# Patient Record
Sex: FEMALE | Race: OTHER | HISPANIC OR LATINO | Employment: UNEMPLOYED | URBAN - METROPOLITAN AREA
[De-identification: names, ages, dates, MRNs, and addresses within clinical notes are randomized per-mention and may not be internally consistent; named-entity substitution may affect disease eponyms.]

---

## 2019-03-09 ENCOUNTER — HOSPITAL ENCOUNTER (OUTPATIENT)
Facility: HOSPITAL | Age: 44
Setting detail: OBSERVATION
Discharge: HOME | End: 2019-03-12
Attending: EMERGENCY MEDICINE | Admitting: INTERNAL MEDICINE
Payer: MEDICAID

## 2019-03-09 DIAGNOSIS — R00.2 PALPITATIONS: ICD-10-CM

## 2019-03-09 DIAGNOSIS — E11.9 DIABETES (HCC): ICD-10-CM

## 2019-03-09 DIAGNOSIS — R51.9 HEADACHE: Primary | ICD-10-CM

## 2019-03-09 DIAGNOSIS — I66.9 STENOSIS OF INTRACRANIAL VESSEL: ICD-10-CM

## 2019-03-09 PROCEDURE — 99285 EMERGENCY DEPT VISIT HI MDM: CPT

## 2019-03-10 ENCOUNTER — APPOINTMENT (OUTPATIENT)
Dept: MRI IMAGING | Facility: HOSPITAL | Age: 44
End: 2019-03-10
Payer: MEDICAID

## 2019-03-10 ENCOUNTER — APPOINTMENT (EMERGENCY)
Dept: CT IMAGING | Facility: HOSPITAL | Age: 44
End: 2019-03-10
Payer: MEDICAID

## 2019-03-10 PROBLEM — I65.23 BILATERAL CAROTID ARTERY STENOSIS: Status: ACTIVE | Noted: 2019-03-10

## 2019-03-10 PROBLEM — G44.51 HEMICRANIA CONTINUA: Status: ACTIVE | Noted: 2019-03-10

## 2019-03-10 LAB
ALBUMIN SERPL BCP-MCNC: 4.2 G/DL (ref 3.5–5)
ALP SERPL-CCNC: 55 U/L (ref 46–116)
ALT SERPL W P-5'-P-CCNC: 30 U/L (ref 12–78)
ANION GAP SERPL CALCULATED.3IONS-SCNC: 10 MMOL/L (ref 4–13)
AST SERPL W P-5'-P-CCNC: 17 U/L (ref 5–45)
ATRIAL RATE: 96 BPM
BASOPHILS # BLD AUTO: 0.09 THOUSANDS/ΜL (ref 0–0.1)
BASOPHILS NFR BLD AUTO: 1 % (ref 0–1)
BILIRUB SERPL-MCNC: 0.3 MG/DL (ref 0.2–1)
BILIRUB UR QL STRIP: NEGATIVE
BUN SERPL-MCNC: 16 MG/DL (ref 5–25)
CALCIUM SERPL-MCNC: 9.1 MG/DL (ref 8.3–10.1)
CHLORIDE SERPL-SCNC: 100 MMOL/L (ref 100–108)
CHOLEST SERPL-MCNC: 190 MG/DL (ref 50–200)
CLARITY UR: CLEAR
CO2 SERPL-SCNC: 26 MMOL/L (ref 21–32)
COLOR UR: YELLOW
CREAT SERPL-MCNC: 0.79 MG/DL (ref 0.6–1.3)
CRP SERPL QL: 7.9 MG/L
DEPRECATED D DIMER PPP: 270 NG/ML (FEU)
EOSINOPHIL # BLD AUTO: 0.37 THOUSAND/ΜL (ref 0–0.61)
EOSINOPHIL NFR BLD AUTO: 5 % (ref 0–6)
ERYTHROCYTE [DISTWIDTH] IN BLOOD BY AUTOMATED COUNT: 17 % (ref 11.6–15.1)
ERYTHROCYTE [SEDIMENTATION RATE] IN BLOOD: 7 MM/HOUR (ref 0–20)
EST. AVERAGE GLUCOSE BLD GHB EST-MCNC: 146 MG/DL
EXT PREG TEST URINE: NEGATIVE
FERRITIN SERPL-MCNC: 8 NG/ML (ref 8–388)
GFR SERPL CREATININE-BSD FRML MDRD: 91 ML/MIN/1.73SQ M
GLUCOSE SERPL-MCNC: 172 MG/DL (ref 65–140)
GLUCOSE UR STRIP-MCNC: NEGATIVE MG/DL
HBA1C MFR BLD: 6.7 % (ref 4.2–6.3)
HCT VFR BLD AUTO: 37.8 % (ref 34.8–46.1)
HDLC SERPL-MCNC: 43 MG/DL (ref 40–60)
HGB BLD-MCNC: 11.6 G/DL (ref 11.5–15.4)
HGB UR QL STRIP.AUTO: NEGATIVE
IMM GRANULOCYTES # BLD AUTO: 0.04 THOUSAND/UL (ref 0–0.2)
IMM GRANULOCYTES NFR BLD AUTO: 1 % (ref 0–2)
IRON SATN MFR SERPL: 34 %
IRON SERPL-MCNC: 163 UG/DL (ref 50–170)
KETONES UR STRIP-MCNC: NEGATIVE MG/DL
LDLC SERPL CALC-MCNC: 122 MG/DL (ref 0–100)
LEUKOCYTE ESTERASE UR QL STRIP: NEGATIVE
LYMPHOCYTES # BLD AUTO: 1.85 THOUSANDS/ΜL (ref 0.6–4.47)
LYMPHOCYTES NFR BLD AUTO: 23 % (ref 14–44)
MAGNESIUM SERPL-MCNC: 1.9 MG/DL (ref 1.6–2.6)
MCH RBC QN AUTO: 24.9 PG (ref 26.8–34.3)
MCHC RBC AUTO-ENTMCNC: 30.7 G/DL (ref 31.4–37.4)
MCV RBC AUTO: 81 FL (ref 82–98)
MONOCYTES # BLD AUTO: 0.42 THOUSAND/ΜL (ref 0.17–1.22)
MONOCYTES NFR BLD AUTO: 5 % (ref 4–12)
NEUTROPHILS # BLD AUTO: 5.17 THOUSANDS/ΜL (ref 1.85–7.62)
NEUTS SEG NFR BLD AUTO: 65 % (ref 43–75)
NITRITE UR QL STRIP: NEGATIVE
NRBC BLD AUTO-RTO: 0 /100 WBCS
P AXIS: 56 DEGREES
PH UR STRIP.AUTO: 5.5 [PH]
PLATELET # BLD AUTO: 353 THOUSANDS/UL (ref 149–390)
PLATELET # BLD AUTO: 403 THOUSANDS/UL (ref 149–390)
PMV BLD AUTO: 9.8 FL (ref 8.9–12.7)
PMV BLD AUTO: 9.9 FL (ref 8.9–12.7)
POTASSIUM SERPL-SCNC: 3.7 MMOL/L (ref 3.5–5.3)
PR INTERVAL: 164 MS
PROT SERPL-MCNC: 8 G/DL (ref 6.4–8.2)
PROT UR STRIP-MCNC: NEGATIVE MG/DL
QRS AXIS: 55 DEGREES
QRSD INTERVAL: 86 MS
QT INTERVAL: 360 MS
QTC INTERVAL: 454 MS
RBC # BLD AUTO: 4.65 MILLION/UL (ref 3.81–5.12)
SODIUM SERPL-SCNC: 136 MMOL/L (ref 136–145)
SP GR UR STRIP.AUTO: 1.01 (ref 1–1.03)
T WAVE AXIS: 22 DEGREES
TIBC SERPL-MCNC: 479 UG/DL (ref 250–450)
TRIGL SERPL-MCNC: 125 MG/DL
TROPONIN I SERPL-MCNC: <0.02 NG/ML
TSH SERPL DL<=0.05 MIU/L-ACNC: 1.69 UIU/ML (ref 0.36–3.74)
UROBILINOGEN UR QL STRIP.AUTO: 0.2 E.U./DL
VENTRICULAR RATE: 96 BPM
WBC # BLD AUTO: 7.94 THOUSAND/UL (ref 4.31–10.16)

## 2019-03-10 PROCEDURE — 82652 VIT D 1 25-DIHYDROXY: CPT | Performed by: PSYCHIATRY & NEUROLOGY

## 2019-03-10 PROCEDURE — 85379 FIBRIN DEGRADATION QUANT: CPT | Performed by: PSYCHIATRY & NEUROLOGY

## 2019-03-10 PROCEDURE — 85306 CLOT INHIBIT PROT S FREE: CPT | Performed by: PSYCHIATRY & NEUROLOGY

## 2019-03-10 PROCEDURE — 85025 COMPLETE CBC W/AUTO DIFF WBC: CPT | Performed by: EMERGENCY MEDICINE

## 2019-03-10 PROCEDURE — 82607 VITAMIN B-12: CPT | Performed by: PSYCHIATRY & NEUROLOGY

## 2019-03-10 PROCEDURE — 83036 HEMOGLOBIN GLYCOSYLATED A1C: CPT | Performed by: INTERNAL MEDICINE

## 2019-03-10 PROCEDURE — 99220 PR INITIAL OBSERVATION CARE/DAY 70 MINUTES: CPT | Performed by: INTERNAL MEDICINE

## 2019-03-10 PROCEDURE — 85670 THROMBIN TIME PLASMA: CPT | Performed by: PSYCHIATRY & NEUROLOGY

## 2019-03-10 PROCEDURE — 81240 F2 GENE: CPT | Performed by: PSYCHIATRY & NEUROLOGY

## 2019-03-10 PROCEDURE — 85652 RBC SED RATE AUTOMATED: CPT | Performed by: EMERGENCY MEDICINE

## 2019-03-10 PROCEDURE — 85303 CLOT INHIBIT PROT C ACTIVITY: CPT | Performed by: PSYCHIATRY & NEUROLOGY

## 2019-03-10 PROCEDURE — 93010 ELECTROCARDIOGRAM REPORT: CPT | Performed by: INTERNAL MEDICINE

## 2019-03-10 PROCEDURE — 85732 THROMBOPLASTIN TIME PARTIAL: CPT | Performed by: PSYCHIATRY & NEUROLOGY

## 2019-03-10 PROCEDURE — 86140 C-REACTIVE PROTEIN: CPT | Performed by: INTERNAL MEDICINE

## 2019-03-10 PROCEDURE — 83550 IRON BINDING TEST: CPT | Performed by: PSYCHIATRY & NEUROLOGY

## 2019-03-10 PROCEDURE — 85305 CLOT INHIBIT PROT S TOTAL: CPT | Performed by: PSYCHIATRY & NEUROLOGY

## 2019-03-10 PROCEDURE — 86235 NUCLEAR ANTIGEN ANTIBODY: CPT | Performed by: PSYCHIATRY & NEUROLOGY

## 2019-03-10 PROCEDURE — 96374 THER/PROPH/DIAG INJ IV PUSH: CPT

## 2019-03-10 PROCEDURE — 83520 IMMUNOASSAY QUANT NOS NONAB: CPT | Performed by: PSYCHIATRY & NEUROLOGY

## 2019-03-10 PROCEDURE — 96375 TX/PRO/DX INJ NEW DRUG ADDON: CPT

## 2019-03-10 PROCEDURE — 81003 URINALYSIS AUTO W/O SCOPE: CPT | Performed by: EMERGENCY MEDICINE

## 2019-03-10 PROCEDURE — 85300 ANTITHROMBIN III ACTIVITY: CPT | Performed by: PSYCHIATRY & NEUROLOGY

## 2019-03-10 PROCEDURE — 70551 MRI BRAIN STEM W/O DYE: CPT

## 2019-03-10 PROCEDURE — 96361 HYDRATE IV INFUSION ADD-ON: CPT

## 2019-03-10 PROCEDURE — 85705 THROMBOPLASTIN INHIBITION: CPT | Performed by: PSYCHIATRY & NEUROLOGY

## 2019-03-10 PROCEDURE — 86147 CARDIOLIPIN ANTIBODY EA IG: CPT | Performed by: PSYCHIATRY & NEUROLOGY

## 2019-03-10 PROCEDURE — 83735 ASSAY OF MAGNESIUM: CPT | Performed by: EMERGENCY MEDICINE

## 2019-03-10 PROCEDURE — 36415 COLL VENOUS BLD VENIPUNCTURE: CPT | Performed by: EMERGENCY MEDICINE

## 2019-03-10 PROCEDURE — 86162 COMPLEMENT TOTAL (CH50): CPT | Performed by: PSYCHIATRY & NEUROLOGY

## 2019-03-10 PROCEDURE — 85049 AUTOMATED PLATELET COUNT: CPT | Performed by: INTERNAL MEDICINE

## 2019-03-10 PROCEDURE — 85613 RUSSELL VIPER VENOM DILUTED: CPT | Performed by: PSYCHIATRY & NEUROLOGY

## 2019-03-10 PROCEDURE — 84484 ASSAY OF TROPONIN QUANT: CPT | Performed by: EMERGENCY MEDICINE

## 2019-03-10 PROCEDURE — 80053 COMPREHEN METABOLIC PANEL: CPT | Performed by: EMERGENCY MEDICINE

## 2019-03-10 PROCEDURE — 83540 ASSAY OF IRON: CPT | Performed by: PSYCHIATRY & NEUROLOGY

## 2019-03-10 PROCEDURE — 99204 OFFICE O/P NEW MOD 45 MIN: CPT | Performed by: PSYCHIATRY & NEUROLOGY

## 2019-03-10 PROCEDURE — 93005 ELECTROCARDIOGRAM TRACING: CPT

## 2019-03-10 PROCEDURE — 70496 CT ANGIOGRAPHY HEAD: CPT

## 2019-03-10 PROCEDURE — 81025 URINE PREGNANCY TEST: CPT

## 2019-03-10 PROCEDURE — 86146 BETA-2 GLYCOPROTEIN ANTIBODY: CPT | Performed by: PSYCHIATRY & NEUROLOGY

## 2019-03-10 PROCEDURE — 84443 ASSAY THYROID STIM HORMONE: CPT | Performed by: EMERGENCY MEDICINE

## 2019-03-10 PROCEDURE — 81241 F5 GENE: CPT | Performed by: PSYCHIATRY & NEUROLOGY

## 2019-03-10 PROCEDURE — 82728 ASSAY OF FERRITIN: CPT | Performed by: PSYCHIATRY & NEUROLOGY

## 2019-03-10 PROCEDURE — 80061 LIPID PANEL: CPT | Performed by: INTERNAL MEDICINE

## 2019-03-10 PROCEDURE — 70544 MR ANGIOGRAPHY HEAD W/O DYE: CPT

## 2019-03-10 PROCEDURE — 99244 OFF/OP CNSLTJ NEW/EST MOD 40: CPT | Performed by: INTERNAL MEDICINE

## 2019-03-10 RX ORDER — ASPIRIN 81 MG/1
324 TABLET, CHEWABLE ORAL ONCE
Status: COMPLETED | OUTPATIENT
Start: 2019-03-10 | End: 2019-03-10

## 2019-03-10 RX ORDER — DIPHENHYDRAMINE HYDROCHLORIDE 50 MG/ML
25 INJECTION INTRAMUSCULAR; INTRAVENOUS ONCE
Status: COMPLETED | OUTPATIENT
Start: 2019-03-10 | End: 2019-03-10

## 2019-03-10 RX ORDER — KETOROLAC TROMETHAMINE 30 MG/ML
30 INJECTION, SOLUTION INTRAMUSCULAR; INTRAVENOUS EVERY 12 HOURS SCHEDULED
Status: DISCONTINUED | OUTPATIENT
Start: 2019-03-10 | End: 2019-03-12

## 2019-03-10 RX ORDER — LORAZEPAM 2 MG/ML
2 INJECTION INTRAMUSCULAR ONCE
Status: COMPLETED | OUTPATIENT
Start: 2019-03-10 | End: 2019-03-10

## 2019-03-10 RX ORDER — TOPIRAMATE 25 MG/1
25 TABLET ORAL 2 TIMES DAILY
Status: DISCONTINUED | OUTPATIENT
Start: 2019-03-10 | End: 2019-03-12 | Stop reason: HOSPADM

## 2019-03-10 RX ORDER — DIPHENHYDRAMINE HYDROCHLORIDE 50 MG/ML
25 INJECTION INTRAMUSCULAR; INTRAVENOUS EVERY 8 HOURS PRN
Status: DISCONTINUED | OUTPATIENT
Start: 2019-03-10 | End: 2019-03-12 | Stop reason: HOSPADM

## 2019-03-10 RX ORDER — ATORVASTATIN CALCIUM 40 MG/1
80 TABLET, FILM COATED ORAL
Status: DISCONTINUED | OUTPATIENT
Start: 2019-03-10 | End: 2019-03-10

## 2019-03-10 RX ORDER — METOCLOPRAMIDE HYDROCHLORIDE 5 MG/ML
10 INJECTION INTRAMUSCULAR; INTRAVENOUS EVERY 8 HOURS SCHEDULED
Status: DISCONTINUED | OUTPATIENT
Start: 2019-03-10 | End: 2019-03-12

## 2019-03-10 RX ORDER — KETOROLAC TROMETHAMINE 30 MG/ML
15 INJECTION, SOLUTION INTRAMUSCULAR; INTRAVENOUS ONCE
Status: COMPLETED | OUTPATIENT
Start: 2019-03-10 | End: 2019-03-10

## 2019-03-10 RX ORDER — ASPIRIN 81 MG/1
81 TABLET, CHEWABLE ORAL DAILY
Status: DISCONTINUED | OUTPATIENT
Start: 2019-03-10 | End: 2019-03-12

## 2019-03-10 RX ORDER — METOCLOPRAMIDE HYDROCHLORIDE 5 MG/ML
10 INJECTION INTRAMUSCULAR; INTRAVENOUS ONCE
Status: COMPLETED | OUTPATIENT
Start: 2019-03-10 | End: 2019-03-10

## 2019-03-10 RX ORDER — ATORVASTATIN CALCIUM 40 MG/1
40 TABLET, FILM COATED ORAL
Status: DISCONTINUED | OUTPATIENT
Start: 2019-03-11 | End: 2019-03-12

## 2019-03-10 RX ADMIN — ENOXAPARIN SODIUM 40 MG: 40 INJECTION SUBCUTANEOUS at 15:24

## 2019-03-10 RX ADMIN — METOCLOPRAMIDE 10 MG: 5 INJECTION, SOLUTION INTRAMUSCULAR; INTRAVENOUS at 03:49

## 2019-03-10 RX ADMIN — DIPHENHYDRAMINE HYDROCHLORIDE 25 MG: 50 INJECTION, SOLUTION INTRAMUSCULAR; INTRAVENOUS at 03:44

## 2019-03-10 RX ADMIN — METOCLOPRAMIDE 10 MG: 5 INJECTION, SOLUTION INTRAMUSCULAR; INTRAVENOUS at 22:29

## 2019-03-10 RX ADMIN — LORAZEPAM 2 MG: 2 INJECTION INTRAMUSCULAR; INTRAVENOUS at 12:57

## 2019-03-10 RX ADMIN — KETOROLAC TROMETHAMINE 15 MG: 30 INJECTION, SOLUTION INTRAMUSCULAR at 03:45

## 2019-03-10 RX ADMIN — TOPIRAMATE 25 MG: 25 TABLET, FILM COATED ORAL at 18:12

## 2019-03-10 RX ADMIN — ATORVASTATIN CALCIUM 80 MG: 40 TABLET, FILM COATED ORAL at 18:04

## 2019-03-10 RX ADMIN — ASPIRIN 81 MG 324 MG: 81 TABLET ORAL at 06:20

## 2019-03-10 RX ADMIN — SODIUM CHLORIDE 1000 ML: 0.9 INJECTION, SOLUTION INTRAVENOUS at 03:43

## 2019-03-10 RX ADMIN — IOHEXOL 85 ML: 350 INJECTION, SOLUTION INTRAVENOUS at 01:11

## 2019-03-10 RX ADMIN — KETOROLAC TROMETHAMINE 30 MG: 30 INJECTION, SOLUTION INTRAMUSCULAR at 22:28

## 2019-03-10 NOTE — SPEECH THERAPY NOTE
Speech Language/Pathology  Consult received  Records reviewed  Pt admitted c headache  MRI negative and pt passed RN Dysphagia Assessment  SLP spoke to RN, she reported no speech or swallowing deficits at this time  Neurology reported in their note, no speech or swallowing deficits at this time  No additional inpatient Speech Pathology evaluation appears indicated at this time  Please re-consult if additional concerns arise

## 2019-03-10 NOTE — ED NOTES
MRI called and will be here for pt around [id number]  Pt made aware that and informed that medication will be given around 1255        [facility], RN  03/10/19 3435

## 2019-03-10 NOTE — H&P
H&P- Cleo Brothers 1975, 39 y o  female MRN: [id number]    Unit/Bed#: ED 19 Encounter: [id number]    Primary Care Provider: Linsey Real MD   Date and time admitted to hospital: 3/9/2019 11:20 PM        * Bilateral carotid artery stenosis  Assessment & Plan  -consult neurology  -ASA, statin  -lipid profile  -MRI brain    Hemicrania continua  Assessment & Plan  -PRN medication  -send ESR/CRP to r/o Temporal artery arteritis    VTE Prophylaxis: Enoxaparin (Lovenox)  / sequential compression device   Code Status: full code   POLST: POLST form is not discussed and not completed at this time  Discussion with family: patient    Anticipated Length of Stay:  Patient will be admitted on an Observation basis with an anticipated length of stay of  Less than 2 midnights  Justification for Hospital Stay: evaluation of HA, b/l carotid artery stenosis    Total Time for Visit, including Counseling / Coordination of Care: 45 minutes  Greater than 50% of this total time spent on direct patient counseling and coordination of care  Chief Complaint:   headache     History of Present Illness:    Cleo Brothers is a 39 y o  female who presents with headache, for last 3 weeks, different from her usual migraine headache, on right temple, dull, continuous, up/down, associated sometime with severe lightheadedness, almost passing out  Fatigue, lack of energy, tender with pressing hard on lateral right temple, sometime has blurry vision  ER course: CTA head and neck show severe carotid artery stenosis, TELE NEURO was consulted, recommend treating as TIA, consult neurology, further wkup with MRI  Review of Systems:    Review of Systems  A comprehensive 10 point review system conducted all negative except as mentioned in HPI     Past Medical and Surgical History:     Past Medical History:   Diagnosis Date    Asthma     Crohn disease ([facility] [address] )        Past Surgical History:   Procedure Laterality Date     SECTION  HERNIA REPAIR         Meds/Allergies:    Prior to Admission medications    Medication Sig Start Date End Date Taking? Authorizing Provider   ALBUTEROL IN Inhale    Historical Provider, MD   predniSONE 20 mg tablet Take 3 tablets by mouth daily  Patient not taking: Reported on 3/10/2019 10/17/16   Clementina Vargasr,      I have reviewed home medications with patient personally  Allergies: Allergies   Allergen Reactions    Morphine Hives       Social History:     Marital Status: Significant Other   Patient Pre-hospital Living Situation:  Home  Patient Pre-hospital Level of Mobility:  Ambulatory  Patient Pre-hospital Diet Restrictions:  None  Substance Use History:   Social History     Substance and Sexual Activity   Alcohol Use No     Social History     Tobacco Use   Smoking Status Former Smoker   Smokeless Tobacco Never Used     Social History     Substance and Sexual Activity   Drug Use No       Family History:    non-contributory    Physical Exam:     Vitals:   Blood Pressure: 144/82 (03/10/19 0645)  Pulse: 95 (03/10/19 0645)  Temperature: 98 6 °F (37 °C) (03/10/19 0645)  Temp Source: Oral (03/10/19 0645)  Respirations: 18 (03/10/19 0645)  Height: 5' 6" (167 6 cm) (03/09/19 2318)  Weight - Scale: 78 kg (172 lb) (03/10/19 0030)  SpO2: 98 % (03/10/19 0645)    Physical Exam   Constitutional: She is oriented to person, place, and time  She appears well-developed and well-nourished  HENT:   Head: Normocephalic and atraumatic  Eyes: Pupils are equal, round, and reactive to light  EOM are normal    Cardiovascular: Normal rate and regular rhythm  Pulmonary/Chest: Effort normal and breath sounds normal    Abdominal: Soft  There is no tenderness  There is no rebound and no guarding  Musculoskeletal: Normal range of motion  She exhibits no tenderness or deformity  Neurological: She is alert and oriented to person, place, and time  Skin: Skin is warm and dry     Psychiatric: She has a normal mood and affect  Additional Data:     Lab Results: I have personally reviewed pertinent reports  Results from last 7 days   Lab Units 03/10/19  0025   WBC Thousand/uL 7 94   HEMOGLOBIN g/dL 11 6   HEMATOCRIT % 37 8   PLATELETS Thousands/uL 403*   NEUTROS PCT % 65   LYMPHS PCT % 23   MONOS PCT % 5   EOS PCT % 5     Results from last 7 days   Lab Units 03/10/19  0025   SODIUM mmol/L 136   POTASSIUM mmol/L 3 7   CHLORIDE mmol/L 100   CO2 mmol/L 26   BUN mg/dL 16   CREATININE mg/dL 0 79   ANION GAP mmol/L 10   CALCIUM mg/dL 9 1   ALBUMIN g/dL 4 2   TOTAL BILIRUBIN mg/dL 0 30   ALK PHOS U/L 55   ALT U/L 30   AST U/L 17   GLUCOSE RANDOM mg/dL 172*                       Imaging: I have personally reviewed pertinent reports  CTA head with and without contrast   ED Interpretation by Tosin Dotson DO (03/10 0304)   High grade b/l ICA stenosis at the level of the cavernous sinus          EKG, Pathology, and Other Studies Reviewed on Admission:   · EKG: no ST-T cahnges    Allscripts / Epic Records Reviewed: Yes     ** Please Note: This note has been constructed using a voice recognition system   **

## 2019-03-10 NOTE — ASSESSMENT & PLAN NOTE
· consult neurology  · Recommending MRV of cerebral venous sinuses, d-Dimer, benefit from Acetazolamide, will wait until after the MRV  · Past complaining of Headache, likely venous sinus thrombosis  · Will evaluate for hypercoagulable state as well  · Continue ASA, statin  · Lipid profile completed LDL elevated otherwise unremarkable  · -MRI brain

## 2019-03-10 NOTE — CONSULTS
Consultation - Cardiology Team One  Armen Simmons 39 y o  female MRN: [id number]  Unit/Bed#: ED 25 Encounter: [id number]    Inpatient consult to Cardiology  Consult performed by: MICHELLE Betts  Consult ordered by: Robin Castillo MD          Physician Requesting Consult: Emma Peres MD  Reason for Consult / Principal Problem:  Palpitations    HPI: Cardiologist   Kitty Sicard is a 39y o  year old female who has a history of asthma and Crohn's disease presents to the emergency room with a headache x3 weeks  Patient states she has had a right temporal headache associated with fatigue and blurry vision for the last 3 weeks  She is tender to palpation on the right temple  Along with a headache she has also been complaining of palpitations which are associated with lightheadedness, dizziness and a feeling of flip-flopping going on in her chest   Patient has no significant history of coronary artery disease or any risk factors at the present time  EKG on admission showed normal sinus rhythm, CT of the head and MRI of the brain were both negative  On telemetry she is sinus tachycardia with a rate anywhere from the 90s to low 100s  Patient currently takes prednisone 20 mg p o  Daily  TSH was normal as well as D-dimer  Lipid panel was drawn LDL was noted to be elevated at 122       REVIEW OF SYSTEMS:  Constitutional:  Denies fever or chills   Eyes:  Positive for in visual acuity   HENT:  Denies nasal congestion or sore throat, positive for right sided headache   Respiratory:  Denies cough or shortness of breath   Cardiovascular:  Denies chest pain or edema , positive for palpitations  GI:  Denies abdominal pain, nausea, vomiting, bloody stools or diarrhea   :  Denies dysuria, frequency, difficulty in micturition and nocturia  Musculoskeletal:  Denies back pain or joint pain   Neurologic:  Denies headache, focal weakness or sensory changes   Endocrine:  Denies polyuria or polydipsia Lymphatic:  Denies swollen glands   Psychiatric:  Denies depression or anxiety     Historical Information   Past Medical History:   Diagnosis Date    Asthma     Crohn disease ([address] )      Past Surgical History:   Procedure Laterality Date     SECTION      HERNIA REPAIR       Social History     Substance and Sexual Activity   Alcohol Use No     Social History     Substance and Sexual Activity   Drug Use No     Social History     Tobacco Use   Smoking Status Former Smoker   Smokeless Tobacco Never Used       Family History: History reviewed  No pertinent family history  MEDS & ALLERGIES:  all current active meds have been reviewed and current meds:   Current Facility-Administered Medications   Medication Dose Route Frequency    aspirin chewable tablet 81 mg  81 mg Oral Daily    atorvastatin (LIPITOR) tablet 80 mg  80 mg Oral Daily With Dinner    enoxaparin (LOVENOX) subcutaneous injection 40 mg  40 mg Subcutaneous Daily        Allergies   Allergen Reactions    Morphine Hives       OBJECTIVE:  Vitals:   Vitals:    03/10/19 1400   BP: 124/70   Pulse: 90   Resp: 16   Temp:    SpO2: 99%     Body mass index is 27 76 kg/m²  Systolic (05SVR), ROJ:904 , Min:122 , WTW:489     Diastolic (45KDD), JLB:10, Min:70, Max:96      Intake/Output Summary (Last 24 hours) at 3/10/2019 1556  Last data filed at 3/10/2019 0430  Gross per 24 hour   Intake 2000 ml   Output    Net 2000 ml     Weight (last 2 days)     Date/Time   Weight    03/10/19 0030   78 (172)    19 2318   78 (172)            Invasive Devices     Peripheral Intravenous Line            Peripheral IV 03/10/19 Left Arm less than 1 day                PHYSICAL EXAMS:  General:  Patient is not in acute distress, laying in the bed comfortably, awake, alert responding to commands  Head: Normocephalic, Atraumatic     HEENT: White sclera, pink conjunctiva,  PERRLA,pharynx benign  Neck:  Supple, no neck vein distention, carotids+2/+2 no bruits, thyromegaly, adenopathy  Respiratory: clear to P/A  Cardiovascular:  PMI normal, S1-S2 normal, No  Murmurs, thrills, gallops, rubs   Regular rhythm  GI:  Abdomen soft nontender   No hepatosplenomegaly, adenopathy, ascites,or rebound tenderness  Extremities: No edema, normal pulses, no calf tenderness, no joint deformities, no venous disease   Integument:  No skin rashes or ulceration  Lymphatic:  No cervical or inguinal lymphadenopathy  Neurologic:  Patient is awake alert, responding to command, well-oriented to time and place and person moving all extremities    LABORATORY RESULTS:  Results from last 7 days   Lab Units 03/10/19  0025   TROPONIN I ng/mL <0 02     CBC with diff:   Results from last 7 days   Lab Units 03/10/19  0813 03/10/19  0025   WBC Thousand/uL  --  7 94   HEMOGLOBIN g/dL  --  11 6   HEMATOCRIT %  --  37 8   MCV fL  --  81*   PLATELETS Thousands/uL 353 403*   MCH pg  --  24 9*   MCHC g/dL  --  30 7*   RDW %  --  17 0*   MPV fL 9 9 9 8   NRBC AUTO /100 WBCs  --  0       CMP:  Results from last 7 days   Lab Units 03/10/19  0025   POTASSIUM mmol/L 3 7   CHLORIDE mmol/L 100   CO2 mmol/L 26   BUN mg/dL 16   CREATININE mg/dL 0 79   CALCIUM mg/dL 9 1   AST U/L 17   ALT U/L 30   ALK PHOS U/L 55   EGFR ml/min/1 73sq m 91       BMP:  Results from last 7 days   Lab Units 03/10/19  0025   POTASSIUM mmol/L 3 7   CHLORIDE mmol/L 100   CO2 mmol/L 26   BUN mg/dL 16   CREATININE mg/dL 0 79   CALCIUM mg/dL 9 1            Results from last 7 days   Lab Units 03/10/19  0025   MAGNESIUM mg/dL 1 9     Results from last 7 days   Lab Units 03/10/19  0813   HEMOGLOBIN A1C % 6 7*     Results from last 7 days   Lab Units 03/10/19  0025   TSH 3RD GENERATON uIU/mL 1 692           Lipid Profile:   No results found for: CHOL  Lab Results   Component Value Date    HDL 43 03/10/2019     Lab Results   Component Value Date    LDLCALC 122 (H) 03/10/2019     Lab Results   Component Value Date    TRIG 125 03/10/2019       Cardiac testing: Echocardiogram ordered and pending      Imaging:   I have personally reviewed pertinent reports  EKG reviewed personally:  Normal sinus rhythm    Telemetry reviewed personally:  Sinus tachycardia with a rate anywhere from 90s to 100s    Assessment/Plan:    1  Palpitations associated with lightheadedness and dizziness  -echocardiogram ordered and pending  -continue to monitor on telemetry  -TSH normal    2  Hyperlipidemia,   - continue atorvastatin 80 mg p o  Daily  -if patient without acute CVA consider decreasing to 40 mg p o  Daily       Code Status: Level 1 - Full Code    Counseling / Coordination of Care  Total floor / unit time spent today 25 minutes  Greater than 50% of total time was spent with the patient and / or family counseling and / or coordination of care  A description of the counseling / coordination of care: Review of history, current assessment, development of a plan      [address] [place] [address]  3/10/2019,3:56 PM

## 2019-03-10 NOTE — QUICK NOTE
Neurology    Paged around 546 AM from ED regarding patient  Patient with one month of intermittent lightheadedness- unclear if positional and 3 5 weeks of headaches different than her typical migraine  Neuro exam non focal  CTA H/N read by radiology as high grade stenosis bilateral carotid siphons  Reviewed in PACS personally with distal flow to the [facility] branches noted and no evidence of occlusion  No prior hx stroke or TIA reported  SBP 140s  A/P   Given her presentation concern for worsening gradual ICA stenosis vs atypical migraine with her hx  She does have vascular RF-- HLD and former smoker   and Lipitor 80 now and daily  No emergent intervention as NIHSS 0/ no focal deficits and patient with symptom onset for almost a month  Called radiology to discuss CTA however their PACS system is down at this time but should be back soon per them  Recommend admit for observation  SBP recommended upto 140-160s given severe bilateral stenosis   Headache treatment: Patient better with "headache cocktail in ED"  Recommend Toradol IV q6 hours x 4 doses, Depacon 500 IV run over 10-15 minutes q8h  Check Lipid panel, HgbA1C, telemetry, orthostatics  Daytime neurology consultation   Routine neurovascular/neurosurgical consultation IP for further recommendations in regards to the bl ICA stenosis  If any sudden onset focal deficits, call neurology on call immediately      Discussed with ED physician

## 2019-03-10 NOTE — ED PROVIDER NOTES
History  Chief Complaint   Patient presents with    Headache     Pt presents to Ed with R sided headache that comes and goes with "feeling like passing out"  Pt states she wakes up fine, but it develops as day goes on  Pt also with chills      39 y o  F presents w R sided headache that has been worsening over the past month  She states that this is different than headaches she has had in the past   Headache is over the R temporal region  States that she get a sharp pain in this region  No visual changes  No blurry vision, no photophobia  She in addition is c/o generalized weakness and feeling unwell over the past month but is having difficulty explaining her symptoms  Some chest pain, some trouble breathing, no pain w deep inhalation  Some nausea without vomiting  No diarrhea/constipation  No urinary sx  Decreased appetite  Decreased energy  Presents neuro intact w no focal neuro deficit  Prior to Admission Medications   Prescriptions Last Dose Informant Patient Reported? Taking? ALBUTEROL IN 3/9/2019 at 1100  Yes Yes   Sig: Inhale 2 puffs 4 (four) times a day as needed    predniSONE 20 mg tablet Not Taking at Unknown time  No No   Sig: Take 3 tablets by mouth daily   Patient not taking: Reported on 3/10/2019      Facility-Administered Medications: None       Past Medical History:   Diagnosis Date    Asthma     Crohn disease ([facility] [address] )        Past Surgical History:   Procedure Laterality Date     SECTION      HERNIA REPAIR         History reviewed  No pertinent family history  I have reviewed and agree with the history as documented  Social History     Tobacco Use    Smoking status: Former Smoker    Smokeless tobacco: Never Used   Substance Use Topics    Alcohol use: Not Currently    Drug use: No        Review of Systems   Constitutional: Positive for appetite change and fatigue  Negative for chills and fever  HENT: Negative for congestion and sore throat      Eyes: Negative for photophobia and visual disturbance  Respiratory: Negative for apnea, cough, chest tightness and shortness of breath  Cardiovascular: Positive for chest pain and palpitations  Negative for leg swelling  Gastrointestinal: Negative for abdominal pain, constipation, diarrhea, nausea and vomiting  Genitourinary: Negative for dysuria and flank pain  Musculoskeletal: Negative for back pain and neck pain  Skin: Negative for color change and rash  Allergic/Immunologic: Negative for immunocompromised state  Neurological: Positive for light-headedness and headaches  Negative for dizziness, seizures, syncope, facial asymmetry, speech difficulty, weakness and numbness  Psychiatric/Behavioral: Negative for confusion  Physical Exam  Physical Exam   Constitutional: She is oriented to person, place, and time  She appears well-developed and well-nourished  No distress  HENT:   Head: Normocephalic and atraumatic  Right Ear: External ear normal    Left Ear: External ear normal    Mouth/Throat: Oropharynx is clear and moist  No oropharyngeal exudate  Tenderness over the right temporal region  Tenderness to palpation  No overlying skin changes  Eyes: Pupils are equal, round, and reactive to light  Conjunctivae and EOM are normal  Right eye exhibits no discharge  Left eye exhibits no discharge  No scleral icterus  Funduscopic exam is normal, limited without dilation  Neck: Normal range of motion  Neck supple  No JVD present  Cardiovascular: Regular rhythm, normal heart sounds and intact distal pulses  Exam reveals no gallop and no friction rub  No murmur heard  Mild tachycardia   Pulmonary/Chest: Effort normal and breath sounds normal  No respiratory distress  She has no wheezes  She has no rales  She exhibits tenderness  Abdominal: Soft  Bowel sounds are normal  She exhibits no distension  There is no tenderness  There is no rebound and no guarding     Musculoskeletal: Normal range of motion  She exhibits no edema, tenderness or deformity  Neurological: She is alert and oriented to person, place, and time  No cranial nerve deficit or sensory deficit  Skin: Skin is warm and dry  No rash noted  She is not diaphoretic  No erythema  No pallor  Psychiatric: She has a normal mood and affect  Her behavior is normal    Vitals reviewed        Vital Signs  ED Triage Vitals   Temperature Pulse Respirations Blood Pressure SpO2   03/09/19 2318 03/09/19 2318 03/09/19 2318 03/09/19 2318 03/09/19 2318   98 4 °F (36 9 °C) (!) 136 18 165/96 100 %      Temp Source Heart Rate Source Patient Position - Orthostatic VS BP Location FiO2 (%)   03/09/19 2318 03/09/19 2318 03/09/19 2318 03/09/19 2318 --   Oral Monitor Sitting Left arm       Pain Score       03/09/19 2319       6           Vitals:    03/11/19 0403 03/11/19 0809 03/11/19 1540 03/11/19 1925   BP:  114/77 131/84 126/82   Pulse:  90 88 91   Patient Position - Orthostatic VS: Lying Lying Lying Lying       Visual Acuity  Visual Acuity      Most Recent Value   R Pupil Size (mm)  3          ED Medications  Medications   aspirin chewable tablet 81 mg (81 mg Oral Given 3/11/19 0934)   enoxaparin (LOVENOX) subcutaneous injection 40 mg (40 mg Subcutaneous Given 3/11/19 0934)   metoclopramide (REGLAN) injection 10 mg (10 mg Intravenous Given 3/11/19 2234)   ketorolac (TORADOL) injection 30 mg (30 mg Intravenous Given 3/11/19 2036)   diphenhydrAMINE (BENADRYL) injection 25 mg (has no administration in time range)   topiramate (TOPAMAX) tablet 25 mg (25 mg Oral Given 3/11/19 1747)   atorvastatin (LIPITOR) tablet 40 mg (40 mg Oral Given 3/11/19 1747)   insulin lispro (HumaLOG) 100 units/mL subcutaneous injection 1-5 Units (1 Units Subcutaneous Not Given 3/11/19 1747)   insulin lispro (HumaLOG) 100 units/mL subcutaneous injection 1-5 Units (1 Units Subcutaneous Not Given 3/11/19 2115)   ipratropium-albuterol (DUO-NEB) 0 5-2 5 mg/3 mL inhalation solution 3 mL (has no administration in time range)   albuterol inhalation solution 2 5 mg (has no administration in time range)   iohexol (OMNIPAQUE) 350 MG/ML injection (MULTI-DOSE) 85 mL (85 mL Intravenous Given 3/10/19 0111)   ketorolac (TORADOL) injection 15 mg (15 mg Intravenous Given 3/10/19 0345)   metoclopramide (REGLAN) injection 10 mg (10 mg Intravenous Given 3/10/19 0349)   diphenhydrAMINE (BENADRYL) injection 25 mg (25 mg Intravenous Given 3/10/19 0344)   sodium chloride 0 9 % bolus 1,000 mL (0 mL Intravenous Stopped 3/10/19 0430)   aspirin chewable tablet 324 mg (324 mg Oral Given 3/10/19 0620)   LORazepam (ATIVAN) 2 mg/mL injection 2 mg (2 mg Intravenous Given 3/10/19 1257)       Diagnostic Studies  Results Reviewed     Procedure Component Value Units Date/Time    Hemoglobin A1C [[id number]]  (Abnormal) Collected:  03/11/19 0603    Lab Status:  Final result Specimen:  Blood from Arm, Right Updated:  03/11/19 0851     Hemoglobin A1C 6 6 %       mg/dl     Lipid Panel with Direct LDL reflex [[id number]]  (Abnormal) Collected:  03/11/19 0603    Lab Status:  Final result Specimen:  Blood from Hand, Right Updated:  03/11/19 0464     Cholesterol 190 mg/dL      Triglycerides 130 mg/dL      HDL, Direct 40 mg/dL      LDL Calculated 124 mg/dL     Basic metabolic panel [[id number]]  (Abnormal) Collected:  03/11/19 0603    Lab Status:  Final result Specimen:  Blood from Hand, Right Updated:  03/11/19 7839     Sodium 139 mmol/L      Potassium 3 8 mmol/L      Chloride 102 mmol/L      CO2 25 mmol/L      ANION GAP 12 mmol/L      BUN 12 mg/dL      Creatinine 0 76 mg/dL      Glucose 139 mg/dL      Glucose, Fasting 139 mg/dL      Calcium 8 8 mg/dL      eGFR 96 ml/min/1 73sq m     Narrative:       National Kidney Disease Education Program recommendations are as follows:  GFR calculation is accurate only with a steady state creatinine  Chronic Kidney disease less than 60 ml/min/1 73 sq  meters  Kidney failure less than 15 ml/min/1 73 sq  meters  Lipid Panel with Direct LDL reflex [[id number]]  (Abnormal) Collected:  03/11/19 0603    Lab Status:  Final result Specimen:  Blood from Arm, Right Updated:  03/11/19 0635     Cholesterol 190 mg/dL      Triglycerides 139 mg/dL      HDL, Direct 39 mg/dL      LDL Calculated 123 mg/dL     CBC and differential [[id number]]  (Abnormal) Collected:  03/11/19 0603    Lab Status:  Final result Specimen:  Blood from Heel, Right Updated:  03/11/19 0615     WBC 6 48 Thousand/uL      RBC 4 54 Million/uL      Hemoglobin 11 0 g/dL      Hematocrit 37 2 %      MCV 82 fL      MCH 24 2 pg      MCHC 29 6 g/dL      RDW 17 2 %      MPV 9 7 fL      Platelets 001 Thousands/uL      nRBC 0 /100 WBCs      Neutrophils Relative 61 %      Immat GRANS % 1 %      Lymphocytes Relative 25 %      Monocytes Relative 7 %      Eosinophils Relative 5 %      Basophils Relative 1 %      Neutrophils Absolute 4 00 Thousands/µL      Immature Grans Absolute 0 04 Thousand/uL      Lymphocytes Absolute 1 61 Thousands/µL      Monocytes Absolute 0 46 Thousand/µL      Eosinophils Absolute 0 29 Thousand/µL      Basophils Absolute 0 08 Thousands/µL     Anti-DNA antibody, double-stranded [[id number]] Collected:  03/11/19 0603    Lab Status:   In process Specimen:  Blood from Arm, Right Updated:  03/11/19 0609    Vitamin B12 [[id number]]  (Normal) Collected:  03/10/19 1126    Lab Status:  Final result Specimen:  Blood Updated:  03/11/19 0205     Vitamin B-12 706 pg/mL     Hemoglobin A1c w/EAG Estimation [[id number]]  (Abnormal) Collected:  03/10/19 0813    Lab Status:  Final result Specimen:  Blood from Arm, Left Updated:  03/10/19 1351     Hemoglobin A1C 6 7 %       mg/dl     Iron Saturation % [[id number]]  (Abnormal) Collected:  03/10/19 0633    Lab Status:  Final result Specimen:  Blood Updated:  03/10/19 1349     Iron Saturation 34 %      TIBC 479 ug/dL      Iron 163 ug/dL     Ferritin [[id number]]  (Normal) Collected:  03/10/19 3734    Lab Status:  Final result Specimen:  Blood Updated:  03/10/19 1349     Ferritin 8 ng/mL     Vitamin D 1,25 dihydroxy [[id number]] Collected:  03/10/19 1120    Lab Status: In process Specimen:  Blood Updated:  03/10/19 1123    D-dimer, quantitative [[id number]]  (Normal) Collected:  03/10/19 1044    Lab Status:  Final result Specimen:  Blood from Arm, Left Updated:  03/10/19 1119     D-Dimer, Quant 270 ng/ml (FEU)     Complement, total [[id number]] Collected:  03/10/19 1039    Lab Status: In process Specimen:  Blood from Arm, Left Updated:  03/10/19 1044    Factor 5 leiden [[id number]] Collected:  03/10/19 1033    Lab Status: In process Specimen:  Blood from Arm, Left Updated:  03/10/19 1044    Prothrombin gene mutation [[id number]] Collected:  03/10/19 1033    Lab Status: In process Specimen:  Blood from Arm, Left Updated:  03/10/19 1044    Protein S activity [[id number]] Collected:  03/10/19 1035    Lab Status: In process Specimen:  Blood from Arm, Left Updated:  03/10/19 1043    Lupus anticoagulant [[id number]] Collected:  03/10/19 1036    Lab Status: In process Specimen:  Blood from Arm, Left Updated:  03/10/19 1043    Antithrombin III Activity [[id number]] Collected:  03/10/19 1036    Lab Status: In process Specimen:  Blood from Arm, Left Updated:  03/10/19 1043    Protein C activity [[id number]] Collected:  03/10/19 1035    Lab Status: In process Specimen:  Blood from Arm, Left Updated:  03/10/19 1043    Protein S Antigen, Total & Free [[id number]] Collected:  03/10/19 1035    Lab Status: In process Specimen:  Blood from Arm, Left Updated:  03/10/19 1042    Anti-ribonucleic acid antibody [[id number]] Collected:  03/10/19 1034    Lab Status: In process Specimen:  Blood from Arm, Left Updated:  03/10/19 1042    Cardiolipin antibody [[id number]] Collected:  03/10/19 1034    Lab Status: In process Specimen:  Blood from Arm, Left Updated:  03/10/19 1042    Sjogren's Antibodies [[id number]] Collected:  03/10/19 1034    Lab Status:   In process Specimen:  Blood from Arm, Left Updated:  03/10/19 1042    Beta-2 glycoprotein antibodies [[id number]] Collected:  03/10/19 1034    Lab Status: In process Specimen:  Blood from Arm, Left Updated:  03/10/19 1042    Lipid Panel with Direct LDL reflex [[id number]]  (Abnormal) Collected:  03/10/19 0813    Lab Status:  Final result Specimen:  Blood from Arm, Left Updated:  03/10/19 0922     Cholesterol 190 mg/dL      Triglycerides 125 mg/dL      HDL, Direct 43 mg/dL      LDL Calculated 122 mg/dL     C-reactive protein [[id number]]  (Abnormal) Collected:  03/10/19 0813    Lab Status:  Final result Specimen:  Blood from Arm, Left Updated:  03/10/19 0922     CRP 7 9 mg/L     Platelet count [[id number]]  (Normal) Collected:  03/10/19 0813    Lab Status:  Final result Specimen:  Blood from Arm, Left Updated:  03/10/19 0828     Platelets 228 Thousands/uL      MPV 9 9 fL     Sedimentation rate, automated [[id number]]  (Normal) Collected:  03/10/19 0025    Lab Status:  Final result Specimen:  Blood from Arm, Left Updated:  03/10/19 0135     Sed Rate 7 mm/hour     TSH [[id number]]  (Normal) Collected:  03/10/19 0025    Lab Status:  Final result Specimen:  Blood from Arm, Left Updated:  03/10/19 0055     TSH 3RD GENERATON 1 692 uIU/mL     Narrative:       Patients undergoing fluorescein dye angiography may retain small amounts of fluorescein in the body for 48-72 hours post procedure  Samples containing fluorescein can produce falsely depressed TSH values  If the patient had this procedure,a specimen should be resubmitted post fluorescein clearance      Magnesium [[id number]]  (Normal) Collected:  03/10/19 0025    Lab Status:  Final result Specimen:  Blood from Arm, Left Updated:  03/10/19 0055     Magnesium 1 9 mg/dL     UA w Reflex to Microscopic w Reflex to Culture [[id number]] Collected:  03/10/19 0046    Lab Status:  Final result Specimen:  Urine, Clean Catch Updated:  03/10/19 0053     Color, UA Yellow     Clarity, UA Clear Specific [place], UA 1 010     pH, UA 5 5     Leukocytes, UA Negative     Nitrite, UA Negative     Protein, UA Negative mg/dl      Glucose, UA Negative mg/dl      Ketones, UA Negative mg/dl      Urobilinogen, UA 0 2 E U /dl      Bilirubin, UA Negative     Blood, UA Negative    POCT pregnancy, urine [[id number]]  (Normal) Resulted:  03/10/19 0049    Lab Status:  Final result Updated:  03/10/19 0049     EXT PREG TEST UR (Ref: Negative) negative    Troponin I [[id number]]  (Normal) Collected:  03/10/19 0025    Lab Status:  Final result Specimen:  Blood from Arm, Left Updated:  03/10/19 0048     Troponin I <0 02 ng/mL     Comprehensive metabolic panel [[id number]]  (Abnormal) Collected:  03/10/19 0025    Lab Status:  Final result Specimen:  Blood from Arm, Left Updated:  03/10/19 0046     Sodium 136 mmol/L      Potassium 3 7 mmol/L      Chloride 100 mmol/L      CO2 26 mmol/L      ANION GAP 10 mmol/L      BUN 16 mg/dL      Creatinine 0 79 mg/dL      Glucose 172 mg/dL      Calcium 9 1 mg/dL      AST 17 U/L      ALT 30 U/L      Alkaline Phosphatase 55 U/L      Total Protein 8 0 g/dL      Albumin 4 2 g/dL      Total Bilirubin 0 30 mg/dL      eGFR 91 ml/min/1 73sq m     Narrative:       National Kidney Disease Education Program recommendations are as follows:  GFR calculation is accurate only with a steady state creatinine  Chronic Kidney disease less than 60 ml/min/1 73 sq  meters  Kidney failure less than 15 ml/min/1 73 sq  meters      CBC and differential [[id number]]  (Abnormal) Collected:  03/10/19 0025    Lab Status:  Final result Specimen:  Blood from Arm, Left Updated:  03/10/19 0031     WBC 7 94 Thousand/uL      RBC 4 65 Million/uL      Hemoglobin 11 6 g/dL      Hematocrit 37 8 %      MCV 81 fL      MCH 24 9 pg      MCHC 30 7 g/dL      RDW 17 0 %      MPV 9 8 fL      Platelets 656 Thousands/uL      nRBC 0 /100 WBCs      Neutrophils Relative 65 %      Immat GRANS % 1 %      Lymphocytes Relative 23 %      Monocytes Relative 5 %      Eosinophils Relative 5 %      Basophils Relative 1 %      Neutrophils Absolute 5 17 Thousands/µL      Immature Grans Absolute 0 04 Thousand/uL      Lymphocytes Absolute 1 85 Thousands/µL      Monocytes Absolute 0 42 Thousand/µL      Eosinophils Absolute 0 37 Thousand/µL      Basophils Absolute 0 09 Thousands/µL                  MRI brain wo contrast   Final Result by Kori Taylor DO (03/10 1403)      Unremarkable MRI of the brain  No acute intracranial pathology  Workstation performed: [id number]         MRA and or MRV head wo contrast   Final Result by Raúl Green DO (03/10 1407)      Normal MR venography of the brain  Normal flow related enhancement of the intracranial internal carotid arteries on venous velocity imaging  Workstation performed: [id number]         CTA head with and without contrast   ED Interpretation by Michele Malhotra DO (03/10 0304)   High grade b/l ICA stenosis at the level of the cavernous sinus      Final Result by Kori Taylor DO (03/10 0901)      Normal noncontrast imaging of the brain  Abnormal appearance of the intracranial vasculature including the distal cervical and intracranial internal carotid arteries as well as the transverse and sigmoid sinuses  This may be artifactual, related to a poor bolus and technique  Recommend MR    angiography follow-up  No aneurysm  No vascular malformation  Workstation performed: [id number]                    Procedures  Procedures       Phone Contacts  ED Phone Contact    ED Course  ED Course as of Mar 11 2235   Carleen Loss Mar 10, 2019   0135 Not indicative of temporal arteritis  Sed Rate: 7   0330 Given migraine cocktail for sx relief  Page placed to neurology Rima Bazan) for advice regarding dispo for high grade b/l ICA stenosis      0343 Tiger text sent to Dr Elidia Cunningham of Neurology to discuss this case        0128 A page sent to Dr Manda Kimble of neurology who I am now told is the neurologist on call tonight, not [address] text sent to Dr Linette Giron, on call for neurology, to discuss dispo of case  5634 Another page sent to Dr Olamide Joseph who I'm now told is on call for [place]  2990 Discussed with patient findings of intracranial stenosis on CTA  Advised her I am awaiting call back from Neurology  Patient states the migraine cocktail as helping her pain  0200 Attempted to place call through [facility] text to Dr Olamide Joseph who is on for [facility], this is a non working number and I am unable to get in touch with the doctor  2081 Another attempt to reach Dr Olamide Joseph of Neurology  Asked operators to connect to cell phone      1906 Discussed case w Dr Linette Giron who advises starting patient on ASA, statin, and will look at the study regarding abnormal vasculature  0620 Paged slim for admission                Stroke Assessment     Row Name 03/10/19 0404             NIH Stroke Scale    Interval  Baseline      Level of Consciousness (1a )  0      LOC Questions (1b )  0      LOC Commands (1c )  0      Best Gaze (2 )  0      Visual (3 )  0      Facial Palsy (4 )  0      Motor Arm, Left (5a )  0      Motor Arm, Right (5b )  0      Motor Leg, Left (6a )  0      Motor Leg, Right (6b )  0      Limb Ataxia (7 )  0      Sensory (8 )  0      Best Language (9 )  0      Dysarthria (10 )  0      Extinction and Inattention (11 ) (Formerly Neglect)  0      Total  0                          MDM  Number of Diagnoses or Management Options  Headache:   Stenosis of intracranial vessel:   Diagnosis management comments: Headache, right temporal region  Story sounds consistent with temporal arteritis however an ESR is negative  CTA head performed which is concerning for high-grade bilateral ICA stenosis at the level of the cavernous sinus    Attempting to discuss this case with Neurology in regards to disposition out of concern for possible neurologic compromise with this finding on CTA head  Because of the generalized weakness and palpitations, ACS workup performed which is normal   Electrolytes are normal     Discussion with Neurology, this patient should be admitted for stroke pathway workup  Patient agreeable to plan  No further neuro deficit has been identified well in the emergency department and patient has been resting comfortably  Amount and/or Complexity of Data Reviewed  Clinical lab tests: ordered and reviewed  Tests in the radiology section of CPT®: ordered and reviewed  Discuss the patient with other providers: yes        Disposition  Final diagnoses:   Headache   Stenosis of intracranial vessel     Time reflects when diagnosis was documented in both MDM as applicable and the Disposition within this note     Time User Action Codes Description Comment    3/10/2019  4:09 AM Mayra Nicholson Add [R51] Headache     3/10/2019  4:10 AM Mayra Nicholson Add [I66 9] Stenosis of intracranial vessel     3/10/2019  9:56 AM Eliecer Ocampo Add [R00 2] Palpitations     3/11/2019  8:28 AM Aime Levy Add [E11 9] Diabetes [facility])       ED Disposition     ED Disposition Condition Date/Time Comment    Admit Stable Sun Mar 10, 2019  6:45 AM Case was discussed with Karen (DANI) and the patient's admission status was agreed to be Admission Status: observation status to the service [facility]   Follow-up Information    None         Current Discharge Medication List      START taking these medications    Details   topiramate (TOPAMAX) 25 mg tablet Take 1 tablet (25 mg total) by mouth 2 (two) times a day  Qty: 60 tablet, Refills: 0    Associated Diagnoses: Headache         CONTINUE these medications which have NOT CHANGED    Details   ALBUTEROL IN Inhale 2 puffs 4 (four) times a day as needed       predniSONE 20 mg tablet Take 3 tablets by mouth daily  Qty: 15 tablet, Refills: 0           No discharge procedures on file      ED Provider  Electronically Signed by           Terrance Milian DO  03/11/19 0988

## 2019-03-10 NOTE — PLAN OF CARE
Problem: Potential for Falls  Goal: Patient will remain free of falls  Description  INTERVENTIONS:  - Assess patient frequently for physical needs  -  Identify cognitive and physical deficits and behaviors that affect risk of falls    -  [place] fall precautions as indicated by assessment   - Educate patient/family on patient safety including physical limitations  - Instruct patient to call for assistance with activity based on assessment  - Modify environment to reduce risk of injury  - Consider OT/PT consult to assist with strengthening/mobility  Outcome: Progressing     Problem: PAIN - ADULT  Goal: Verbalizes/displays adequate comfort level or baseline comfort level  Description  Interventions:  - Encourage patient to monitor pain and request assistance  - Assess pain using appropriate pain scale  - Administer analgesics based on type and severity of pain and evaluate response  - Implement non-pharmacological measures as appropriate and evaluate response  - Consider cultural and social influences on pain and pain management  - Notify physician/advanced practitioner if interventions unsuccessful or patient reports new pain  Outcome: Progressing     Problem: INFECTION - ADULT  Goal: Absence or prevention of progression during hospitalization  Description  INTERVENTIONS:  - Assess and monitor for signs and symptoms of infection  - Monitor lab/diagnostic results  - Monitor all insertion sites, i e  indwelling lines, tubes, and drains  - Monitor endotracheal (as able) and nasal secretions for changes in amount and color  - [place] appropriate cooling/warming therapies per order  - Administer medications as ordered  - Instruct and encourage patient and family to use good hand hygiene technique  - Identify and instruct in appropriate isolation precautions for identified infection/condition  Outcome: Progressing  Goal: Absence of fever/infection during neutropenic period  Description  INTERVENTIONS:  - Monitor WBC  - Implement neutropenic guidelines  Outcome: Progressing     Problem: SAFETY ADULT  Goal: Patient will remain free of falls  Description  INTERVENTIONS:  - Assess patient frequently for physical needs  -  Identify cognitive and physical deficits and behaviors that affect risk of falls    -  [place] fall precautions as indicated by assessment   - Educate patient/family on patient safety including physical limitations  - Instruct patient to call for assistance with activity based on assessment  - Modify environment to reduce risk of injury  - Consider OT/PT consult to assist with strengthening/mobility  Outcome: Progressing  Goal: Maintain or return to baseline ADL function  Description  INTERVENTIONS:  -  Assess patient's ability to carry out ADLs; assess patient's baseline for ADL function and identify physical deficits which impact ability to perform ADLs (bathing, care of mouth/teeth, toileting, grooming, dressing, etc )  - Assess/evaluate cause of self-care deficits   - Assess range of motion  - Assess patient's mobility; develop plan if impaired  - Assess patient's need for assistive devices and provide as appropriate  - Encourage maximum independence but intervene and supervise when necessary  ¯ Involve family in performance of ADLs  ¯ Assess for home care needs following discharge   ¯ Request OT consult to assist with ADL evaluation and planning for discharge  ¯ Provide patient education as appropriate  Outcome: Progressing  Goal: Maintain or return mobility status to optimal level  Description  INTERVENTIONS:  - Assess patient's baseline mobility status (ambulation, transfers, stairs, etc )    - Identify cognitive and physical deficits and behaviors that affect mobility  - Identify mobility aids required to assist with transfers and/or ambulation (gait belt, sit-to-stand, lift, walker, cane, etc )  - [place] fall precautions as indicated by assessment  - Record patient progress and toleration of activity level on Mobility SBAR; progress patient to next Phase/Stage  - Instruct patient to call for assistance with activity based on assessment  - Request Rehabilitation consult to assist with strengthening/weightbearing, etc   Outcome: Progressing     Problem: DISCHARGE PLANNING  Goal: Discharge to home or other facility with appropriate resources  Description  INTERVENTIONS:  - Identify barriers to discharge w/patient and caregiver  - Arrange for needed discharge resources and transportation as appropriate  - Identify discharge learning needs (meds, wound care, etc )  - Arrange for interpretive services to assist at discharge as needed  - Refer to Case Management Department for coordinating discharge planning if the patient needs post-hospital services based on physician/advanced practitioner order or complex needs related to functional status, cognitive ability, or social support system  Outcome: Progressing     Problem: Knowledge Deficit  Goal: Patient/family/caregiver demonstrates understanding of disease process, treatment plan, medications, and discharge instructions  Description  Complete learning assessment and assess knowledge base  Interventions:  - Provide teaching at level of understanding  - Provide teaching via preferred learning methods  Outcome: Progressing     Problem: NEUROSENSORY - ADULT  Goal: Achieves stable or improved neurological status  Description  INTERVENTIONS  - Monitor and report changes in neurological status  - Initiate measures to prevent increased intracranial pressure  - Maintain blood pressure and fluid volume within ordered parameters to optimize cerebral perfusion  - Monitor temperature, glucose, and sodium or any other associated labs   Initiate appropriate interventions as ordered  - Monitor for seizure activity   - Administer anti-seizure medications as ordered  Outcome: Progressing  Goal: Absence of seizures  Description  INTERVENTIONS  - Monitor for seizure activity  - Administer anti-seizure medications as ordered  - Monitor neurological status  Outcome: Progressing  Goal: Remains free of injury related to seizures activity  Description  INTERVENTIONS  - Maintain airway, patient safety  and administer oxygen as ordered  - Monitor patient for seizure activity, document and report duration and description of seizure to physician/advanced practitioner  - If seizure occurs,  ensure patient safety during seizure  - Reorient patient post seizure  - Seizure pads on all 4 side rails  - Instruct patient/family to notify RN of any seizure activity including if an aura is experienced  - Instruct patient/family to call for assistance with activity based on nursing assessment  - Administer anti-seizure medications as ordered  - Monitor fetal well being  Outcome: Progressing  Goal: Achieves maximal functionality and self care  Description  INTERVENTIONS  - Monitor swallowing and airway patency with patient fatigue and changes in neurological status  - Encourage and assist patient to increase activity and self care with guidance from rehab services  - Encourage visually impaired, hearing impaired and aphasic patients to use assistive/communication devices  Outcome: Progressing     Problem: Neurological Deficit  Goal: Neurological status is stable or improving  Description  Interventions:  - Monitor and assess patient's level of consciousness, motor function, sensory function, and level of assistance needed for ADLs  - Monitor and report changes from baseline  Collaborate with interdisciplinary team to initiate plan and implement interventions as ordered  - Provide and maintain a safe environment  - Utilize seizure precautions  - Utilize fall precautions  - Utilize aspiration precautions  - Utilize bleeding precautions  Outcome: Progressing     Problem:  Activity Intolerance/Impaired Mobility  Goal: Mobility/activity is maintained at optimum level for patient  Description  Interventions:  - Assess and monitor patient  barriers to mobility and need for assistive/adaptive devices  - Assess patient's emotional response to limitations  - Collaborate with interdisciplinary team and initiate plans and interventions as ordered  - Encourage independent activity per ability   - Maintain proper body alignment  - Perform active/passive rom as tolerated/ordered  - Plan activities to conserve energy   - Turn patient  Outcome: Progressing     Problem: Communication Impairment  Goal: Ability to express needs and understand communication  Description  Assess patient's communication skills and ability to understand information  Patient will demonstrate use of effective communication techniques, alternative methods of communication and understanding even if not able to speak  - Encourage communication and provide alternate methods of communication as needed  - Collaborate with case management/ for discharge needs  - Include patient/family/caregiver in decisions related to communication  Outcome: Progressing     Problem: Potential for Aspiration  Goal: Non-ventilated patient's risk of aspiration is minimized  Description  Assess and monitor vital signs, respiratory status, and labs (WBC)  Monitor for signs of aspiration (tachypnea, cough, rales, wheezing, cyanosis, fever)  - Assess and monitor patient's ability to swallow  - Place patient up in chair to eat if possible  - HOB up at 90 degrees to eat if unable to get patient up into chair   - Supervise patient during oral intake  - Instruct patient to take small bites  - Instruct patient to take small single sips when taking liquids  - Follow patient-specific strategies generated by speech pathologist   Outcome: Progressing  Goal: Ventilated patient's risk of aspiration is minimized  Description  Assess and monitor vital signs, respiratory status, airway cuff pressure, and labs (WBC)    Monitor for signs of aspiration (tachypnea, cough, rales, wheezing, cyanosis, fever)  - Elevate head of bed 30 degrees if patient has tube feeding   - Monitor tube feeding  Outcome: Progressing     Problem: Nutrition  Goal: Nutrition/Hydration status is improving  Description  Monitor and assess patient's nutrition/hydration status for malnutrition (ex- brittle hair, bruises, dry skin, pale skin and conjunctiva, muscle wasting, smooth red tongue, and disorientation)  Collaborate with interdisciplinary team and initiate plan and interventions as ordered  Monitor patient's weight and dietary intake as ordered or per policy  Utilize nutrition screening tool and intervene per policy  Determine patient's food preferences and provide high-protein, high-caloric foods as appropriate  - Assist patient with eating   - Allow adequate time for meals   - Encourage patient to take dietary supplement as ordered  - Collaborate with clinical nutritionist   - Include patient/family/caregiver in decisions related to nutrition    Outcome: Progressing

## 2019-03-10 NOTE — ED NOTES
Pt placed on inpatient bed for comfort measures   [facility]  03/10/19 7351

## 2019-03-10 NOTE — QUICK NOTE
Reviewed MRA/MRV, and MR brain, nothing worrisome noted  CRP slightly elevated, d-Dimer is normal  No evidence of venous sinus thrombosis  Will start Topamax 25 mg once daily, to be tapered up gradually to 50 mg twice daily  And have her on Migraine protocol here as inpatient  She can be seen in outpatient neurology in 2 weeks upon discharge, by then we should have results of all the lab work

## 2019-03-10 NOTE — CONSULTS
Consultation - Neurology   Benny Kelsey 39 y o  female MRN: [id number]  Unit/Bed#: ED 25 Encounter: [id number]      Assessment/Plan   Headache, likely venous sinus thrombosis, will get MRV of cerebral venous sinuses, d-Dimer  Will evaluate for hypercoagulable state as well  She would benefit from Acetazolamide, will wait until after the MRV  History of Present Illness     Reason for Consult / Principal Problem: Headache    HPI: Benny Kelsey is a 39 y  o  with history of Asthma, Crohn's disease who presents regarding headaches  She started having a headache about 3 weeks ago, involving predominantly right anterior temporal head region, throbbing, not much nausea or vomiting with it, except feels a little nauseous today, no photo or phonophobia, reports intermittent blurry vision in right eye, intensity is about 7/10, got headache cocktail in ED, headache is somewhat better  Headache is better sitting, worse lying down  She does not report any paresthesias in arms or legs, no weakness in arms or legs, no speech or swallowing issues  She has history of miscarriage and reports intermittent rash over her arms, was told it's eczema  She reports intermittent palpitations, EKG here shows sinus tachy  CTA head and neck shows artifactual filling defects  ESR normal      Consults    Review of Systems   HENT: Negative for trouble swallowing and voice change  Eyes: Positive for visual disturbance  Negative for photophobia  Gastrointestinal: Positive for nausea  Negative for vomiting  Genitourinary: Negative for dysuria  Neurological: Positive for headaches  Negative for dizziness, tremors, seizures, syncope, facial asymmetry, speech difficulty, weakness and numbness  Psychiatric/Behavioral: The patient is nervous/anxious          Historical Information   Past Medical History:   Diagnosis Date    Asthma     Crohn disease ([facility] [address] )      Past Surgical History:   Procedure Laterality Date     SECTION      HERNIA REPAIR       Social History   Social History     Substance and Sexual Activity   Alcohol Use No     Social History     Substance and Sexual Activity   Drug Use No     Social History     Tobacco Use   Smoking Status Former Smoker   Smokeless Tobacco Never Used     Family History: History reviewed  No pertinent family history  Meds/Allergies   current meds:   Current Facility-Administered Medications   Medication Dose Route Frequency    aspirin chewable tablet 81 mg  81 mg Oral Daily    atorvastatin (LIPITOR) tablet 80 mg  80 mg Oral Daily With Dinner    enoxaparin (LOVENOX) subcutaneous injection 40 mg  40 mg Subcutaneous Daily    LORazepam (ATIVAN) 2 mg/mL injection 2 mg  2 mg Intravenous Once       Allergies   Allergen Reactions    Morphine Hives       Objective   Vitals:Blood pressure 138/79, pulse 96, temperature 98 6 °F (37 °C), temperature source Oral, resp  rate 16, height 5' 6" (1 676 m), weight 78 kg (172 lb), SpO2 99 %  ,Body mass index is 27 76 kg/m²  Intake/Output Summary (Last 24 hours) at 3/10/2019 1044  Last data filed at 3/10/2019 0430  Gross per 24 hour   Intake 2000 ml   Output    Net 2000 ml       Invasive Devices: Invasive Devices     Peripheral Intravenous Line            Peripheral IV 03/10/19 Left Arm less than 1 day                Physical Exam   Constitutional: She is oriented to person, place, and time  HENT:   Head: Normocephalic and atraumatic  Pulmonary/Chest: Effort normal    Musculoskeletal: She exhibits no edema  Neurological: She is oriented to person, place, and time  She has normal strength  She has a normal Romberg Test  Gait normal    Reflex Scores:       Tricep reflexes are 2+ on the right side and 2+ on the left side  Bicep reflexes are 2+ on the right side and 2+ on the left side  Brachioradialis reflexes are 2+ on the right side and 2+ on the left side         Patellar reflexes are 2+ on the right side and 2+ on the left side   Vitals reviewed  Neurologic Exam     Mental Status   Oriented to person, place, and time  Cranial Nerves   Cranial nerves II through XII intact  Motor Exam     Strength   Strength 5/5 throughout  Sensory Exam   Light touch normal      Gait, Coordination, and Reflexes     Gait  Gait: normal    Coordination   Romberg: negative    Tremor   Resting tremor: absent    Reflexes   Right brachioradialis: 2+  Left brachioradialis: 2+  Right biceps: 2+  Left biceps: 2+  Right triceps: 2+  Left triceps: 2+  Right patellar: 2+  Left patellar: 2+      Lab Results: I have personally reviewed pertinent reports  Imaging Studies: I have personally reviewed pertinent reports  and I have personally reviewed pertinent films in PACS  EKG, Pathology, and Other Studies: I have personally reviewed pertinent reports          Code Status: Level 1 - Full Code

## 2019-03-10 NOTE — UTILIZATION REVIEW
Initial Clinical Review    Admission: Date/Time/Statement: OBS  3/10  0646    Orders Placed This Encounter   Procedures    Place in Observation     Standing Status:   Standing     Number of Occurrences:   1     Order Specific Question:   Admitting Physician     Answer:   Rosa Harry     Order Specific Question:   Level of Care     Answer:   Med Surg [16]     Order Specific Question:   Bed request comments     Answer:   tele     ED: Date/Time/Mode of Arrival:   ED Arrival Information     Expected Arrival Acuity Means of Arrival Escorted By Service Admission Type    - 3/9/2019 23:14 Emergent Walk-In Family Member General Medicine Emergency    Arrival Complaint    Head Pain        Chief Complaint:   Chief Complaint   Patient presents with    Headache     Pt presents to Ed with R sided headache that comes and goes with "feeling like passing out"  Pt states she wakes up fine, but it develops as day goes on  Pt also with chills      Assessment/Plan: Johny Alex is a 39 y o  female who presents with headache, for last 3 weeks, different from her usual migraine headache, on right temple, dull, continuous, up/down, associated sometime with severe lightheadedness, almost passing out  Fatigue, lack of energy, tender with pressing hard on lateral right temple, sometime has blurry vision       * Bilateral carotid artery stenosis  Assessment & Plan  -consult neurology  -ASA, statin  -lipid profile  -MRI brain   Hemicrania continua  Assessment & Plan  -PRN medication  -send ESR/CRP to r/o Temporal artery arteritis     ED Vital Signs:   ED Triage Vitals   Temperature Pulse Respirations Blood Pressure SpO2   03/09/19 2318 03/09/19 2318 03/09/19 2318 03/09/19 2318 03/09/19 2318   98 4 °F (36 9 °C) (!) 136 18 165/96 100 %      Temp Source Heart Rate Source Patient Position - Orthostatic VS BP Location FiO2 (%)   03/09/19 2318 03/09/19 2318 03/09/19 2318 03/09/19 2318 --   Oral Monitor Sitting Left arm       Pain Score       03/09/19 2319       6        Wt Readings from Last 1 Encounters:   03/10/19 78 kg (172 lb)     Vital Signs (abnormal):  03/10/19 0400    94  18  148/91  99 %       03/10/19 0144  98 6 °F (37 °C)  107Abnormal   18  148/91  99 %  None (Room air)  Lying   03/10/19 0030    100  18  140/82  100 %    Sitting       Pertinent Labs/Diagnostic Test Results: gluc 172  CT head -   High grade b/l ICA stenosis at the level of the cavernous sinus                    ED Treatment:   Medication Administration from 03/09/2019 2313 to 03/10/2019 0859       Date/Time Order Dose Route Action Action by Comments     03/10/2019 0111 iohexol (OMNIPAQUE) 350 MG/ML injection (MULTI-DOSE) 85 mL 85 mL Intravenous Given Corinna Hess      03/10/2019 0345 ketorolac (TORADOL) injection 15 mg 15 mg Intravenous Given Loretta Barbour RN      03/10/2019 0349 metoclopramide (REGLAN) injection 10 mg 10 mg Intravenous Given Loretta Barbour RN      03/10/2019 0344 diphenhydrAMINE (BENADRYL) injection 25 mg 25 mg Intravenous Given Loretta Barbour RN      03/10/2019 0430 sodium chloride 0 9 % bolus 1,000 mL 0 mL Intravenous Stopped Loretta Barbour RN      03/10/2019 0343 sodium chloride 0 9 % bolus 1,000 mL 1,000 mL Intravenous [address] Loretta Barbour RN      03/10/2019 8067 aspirin chewable tablet 324 mg 324 mg Oral Given Loretta Barbour RN      03/10/2019 3429 aspirin chewable tablet 81 mg 81 mg Oral Not Given Emanuel Roberts RN 324mg ASA given by {rior shift at 0645        Past Medical/Surgical History:    Active Ambulatory Problems     Diagnosis Date Noted    No Active Ambulatory Problems     Past Medical History:   Diagnosis Date    Asthma     Cro[facility] )      Admitting Diagnosis: Headache [R51]  Age/Sex: 39 y o  female  Admission Orders:  Scheduled Meds:   Current Facility-Administered Medications:  aspirin 81 mg Oral Daily    atorvastatin 80 mg Oral Daily With Dinner    enoxaparin 40 mg Subcutaneous Daily      SCD  Up and oOB as manju   Dysphagia eval   Neuro checks  q4hr  wnl   Tele   Neuro consult   OT PT eval   Speech eval   3/10 c-reactive prot , lipid profile, hgba 1c , cbc , bmp   MRI   Echo

## 2019-03-11 ENCOUNTER — APPOINTMENT (OUTPATIENT)
Dept: NON INVASIVE DIAGNOSTICS | Facility: HOSPITAL | Age: 44
End: 2019-03-11
Payer: MEDICAID

## 2019-03-11 PROBLEM — E78.5 HYPERLIPIDEMIA: Status: ACTIVE | Noted: 2019-03-11

## 2019-03-11 PROBLEM — R51.9 HEADACHE: Status: ACTIVE | Noted: 2019-03-11

## 2019-03-11 PROBLEM — J45.909 ASTHMA: Status: ACTIVE | Noted: 2019-03-11

## 2019-03-11 PROBLEM — E11.9 DIABETES (HCC): Status: ACTIVE | Noted: 2019-03-11

## 2019-03-11 PROBLEM — R00.2 PALPITATIONS: Status: ACTIVE | Noted: 2019-03-11

## 2019-03-11 LAB
ANION GAP SERPL CALCULATED.3IONS-SCNC: 12 MMOL/L (ref 4–13)
BASOPHILS # BLD AUTO: 0.08 THOUSANDS/ΜL (ref 0–0.1)
BASOPHILS NFR BLD AUTO: 1 % (ref 0–1)
BUN SERPL-MCNC: 12 MG/DL (ref 5–25)
CALCIUM SERPL-MCNC: 8.8 MG/DL (ref 8.3–10.1)
CHLORIDE SERPL-SCNC: 102 MMOL/L (ref 100–108)
CHOLEST SERPL-MCNC: 190 MG/DL (ref 50–200)
CHOLEST SERPL-MCNC: 190 MG/DL (ref 50–200)
CO2 SERPL-SCNC: 25 MMOL/L (ref 21–32)
CREAT SERPL-MCNC: 0.76 MG/DL (ref 0.6–1.3)
EOSINOPHIL # BLD AUTO: 0.29 THOUSAND/ΜL (ref 0–0.61)
EOSINOPHIL NFR BLD AUTO: 5 % (ref 0–6)
ERYTHROCYTE [DISTWIDTH] IN BLOOD BY AUTOMATED COUNT: 17.2 % (ref 11.6–15.1)
EST. AVERAGE GLUCOSE BLD GHB EST-MCNC: 143 MG/DL
GFR SERPL CREATININE-BSD FRML MDRD: 96 ML/MIN/1.73SQ M
GLUCOSE P FAST SERPL-MCNC: 139 MG/DL (ref 65–99)
GLUCOSE SERPL-MCNC: 115 MG/DL (ref 65–140)
GLUCOSE SERPL-MCNC: 139 MG/DL (ref 65–140)
GLUCOSE SERPL-MCNC: 164 MG/DL (ref 65–140)
GLUCOSE SERPL-MCNC: 96 MG/DL (ref 65–140)
HBA1C MFR BLD: 6.6 % (ref 4.2–6.3)
HCT VFR BLD AUTO: 37.2 % (ref 34.8–46.1)
HDLC SERPL-MCNC: 39 MG/DL (ref 40–60)
HDLC SERPL-MCNC: 40 MG/DL (ref 40–60)
HGB BLD-MCNC: 11 G/DL (ref 11.5–15.4)
IMM GRANULOCYTES # BLD AUTO: 0.04 THOUSAND/UL (ref 0–0.2)
IMM GRANULOCYTES NFR BLD AUTO: 1 % (ref 0–2)
LDLC SERPL CALC-MCNC: 123 MG/DL (ref 0–100)
LDLC SERPL CALC-MCNC: 124 MG/DL (ref 0–100)
LYMPHOCYTES # BLD AUTO: 1.61 THOUSANDS/ΜL (ref 0.6–4.47)
LYMPHOCYTES NFR BLD AUTO: 25 % (ref 14–44)
MCH RBC QN AUTO: 24.2 PG (ref 26.8–34.3)
MCHC RBC AUTO-ENTMCNC: 29.6 G/DL (ref 31.4–37.4)
MCV RBC AUTO: 82 FL (ref 82–98)
MONOCYTES # BLD AUTO: 0.46 THOUSAND/ΜL (ref 0.17–1.22)
MONOCYTES NFR BLD AUTO: 7 % (ref 4–12)
NEUTROPHILS # BLD AUTO: 4 THOUSANDS/ΜL (ref 1.85–7.62)
NEUTS SEG NFR BLD AUTO: 61 % (ref 43–75)
NRBC BLD AUTO-RTO: 0 /100 WBCS
PLATELET # BLD AUTO: 392 THOUSANDS/UL (ref 149–390)
PMV BLD AUTO: 9.7 FL (ref 8.9–12.7)
POTASSIUM SERPL-SCNC: 3.8 MMOL/L (ref 3.5–5.3)
RBC # BLD AUTO: 4.54 MILLION/UL (ref 3.81–5.12)
SODIUM SERPL-SCNC: 139 MMOL/L (ref 136–145)
TRIGL SERPL-MCNC: 130 MG/DL
TRIGL SERPL-MCNC: 139 MG/DL
VIT B12 SERPL-MCNC: 706 PG/ML (ref 100–900)
WBC # BLD AUTO: 6.48 THOUSAND/UL (ref 4.31–10.16)

## 2019-03-11 PROCEDURE — 99232 SBSQ HOSP IP/OBS MODERATE 35: CPT | Performed by: PHYSICIAN ASSISTANT

## 2019-03-11 PROCEDURE — 99232 SBSQ HOSP IP/OBS MODERATE 35: CPT | Performed by: GENERAL PRACTICE

## 2019-03-11 PROCEDURE — 85025 COMPLETE CBC W/AUTO DIFF WBC: CPT | Performed by: INTERNAL MEDICINE

## 2019-03-11 PROCEDURE — G8989 SELF CARE D/C STATUS: HCPCS

## 2019-03-11 PROCEDURE — 86225 DNA ANTIBODY NATIVE: CPT | Performed by: PSYCHIATRY & NEUROLOGY

## 2019-03-11 PROCEDURE — G8979 MOBILITY GOAL STATUS: HCPCS

## 2019-03-11 PROCEDURE — 94640 AIRWAY INHALATION TREATMENT: CPT

## 2019-03-11 PROCEDURE — 97163 PT EVAL HIGH COMPLEX 45 MIN: CPT

## 2019-03-11 PROCEDURE — 80048 BASIC METABOLIC PNL TOTAL CA: CPT | Performed by: INTERNAL MEDICINE

## 2019-03-11 PROCEDURE — 99213 OFFICE O/P EST LOW 20 MIN: CPT | Performed by: INTERNAL MEDICINE

## 2019-03-11 PROCEDURE — G8978 MOBILITY CURRENT STATUS: HCPCS

## 2019-03-11 PROCEDURE — 94760 N-INVAS EAR/PLS OXIMETRY 1: CPT

## 2019-03-11 PROCEDURE — 97165 OT EVAL LOW COMPLEX 30 MIN: CPT

## 2019-03-11 PROCEDURE — 93306 TTE W/DOPPLER COMPLETE: CPT

## 2019-03-11 PROCEDURE — 82948 REAGENT STRIP/BLOOD GLUCOSE: CPT

## 2019-03-11 PROCEDURE — 93306 TTE W/DOPPLER COMPLETE: CPT | Performed by: INTERNAL MEDICINE

## 2019-03-11 PROCEDURE — G8980 MOBILITY D/C STATUS: HCPCS

## 2019-03-11 PROCEDURE — G8988 SELF CARE GOAL STATUS: HCPCS

## 2019-03-11 PROCEDURE — 80061 LIPID PANEL: CPT | Performed by: INTERNAL MEDICINE

## 2019-03-11 PROCEDURE — G8987 SELF CARE CURRENT STATUS: HCPCS

## 2019-03-11 PROCEDURE — 83036 HEMOGLOBIN GLYCOSYLATED A1C: CPT | Performed by: INTERNAL MEDICINE

## 2019-03-11 RX ORDER — IPRATROPIUM BROMIDE AND ALBUTEROL SULFATE 2.5; .5 MG/3ML; MG/3ML
3 SOLUTION RESPIRATORY (INHALATION)
Status: DISCONTINUED | OUTPATIENT
Start: 2019-03-11 | End: 2019-03-11

## 2019-03-11 RX ORDER — TOPIRAMATE 25 MG/1
25 TABLET ORAL 2 TIMES DAILY
Qty: 60 TABLET | Refills: 0 | Status: SHIPPED | OUTPATIENT
Start: 2019-03-11 | End: 2019-03-12

## 2019-03-11 RX ORDER — ALBUTEROL SULFATE 2.5 MG/3ML
2.5 SOLUTION RESPIRATORY (INHALATION) EVERY 6 HOURS PRN
Status: DISCONTINUED | OUTPATIENT
Start: 2019-03-11 | End: 2019-03-12 | Stop reason: HOSPADM

## 2019-03-11 RX ORDER — IPRATROPIUM BROMIDE AND ALBUTEROL SULFATE 2.5; .5 MG/3ML; MG/3ML
3 SOLUTION RESPIRATORY (INHALATION)
Status: DISCONTINUED | OUTPATIENT
Start: 2019-03-12 | End: 2019-03-12 | Stop reason: HOSPADM

## 2019-03-11 RX ADMIN — KETOROLAC TROMETHAMINE 30 MG: 30 INJECTION, SOLUTION INTRAMUSCULAR at 20:36

## 2019-03-11 RX ADMIN — ATORVASTATIN CALCIUM 40 MG: 40 TABLET, FILM COATED ORAL at 17:47

## 2019-03-11 RX ADMIN — IPRATROPIUM BROMIDE AND ALBUTEROL SULFATE 3 ML: 2.5; .5 SOLUTION RESPIRATORY (INHALATION) at 20:15

## 2019-03-11 RX ADMIN — INSULIN LISPRO 1 UNITS: 100 INJECTION, SOLUTION INTRAVENOUS; SUBCUTANEOUS at 14:49

## 2019-03-11 RX ADMIN — METOCLOPRAMIDE 10 MG: 5 INJECTION, SOLUTION INTRAMUSCULAR; INTRAVENOUS at 22:34

## 2019-03-11 RX ADMIN — ENOXAPARIN SODIUM 40 MG: 40 INJECTION SUBCUTANEOUS at 09:34

## 2019-03-11 RX ADMIN — IPRATROPIUM BROMIDE AND ALBUTEROL SULFATE 3 ML: 2.5; .5 SOLUTION RESPIRATORY (INHALATION) at 13:22

## 2019-03-11 RX ADMIN — TOPIRAMATE 25 MG: 25 TABLET, FILM COATED ORAL at 17:47

## 2019-03-11 RX ADMIN — KETOROLAC TROMETHAMINE 30 MG: 30 INJECTION, SOLUTION INTRAMUSCULAR at 09:34

## 2019-03-11 RX ADMIN — METOCLOPRAMIDE 10 MG: 5 INJECTION, SOLUTION INTRAMUSCULAR; INTRAVENOUS at 05:51

## 2019-03-11 RX ADMIN — METOCLOPRAMIDE 10 MG: 5 INJECTION, SOLUTION INTRAMUSCULAR; INTRAVENOUS at 14:48

## 2019-03-11 RX ADMIN — IPRATROPIUM BROMIDE AND ALBUTEROL SULFATE 3 ML: 2.5; .5 SOLUTION RESPIRATORY (INHALATION) at 08:42

## 2019-03-11 RX ADMIN — TOPIRAMATE 25 MG: 25 TABLET, FILM COATED ORAL at 09:34

## 2019-03-11 RX ADMIN — ASPIRIN 81 MG 81 MG: 81 TABLET ORAL at 09:34

## 2019-03-11 RX ADMIN — DIPHENHYDRAMINE HYDROCHLORIDE 25 MG: 50 INJECTION, SOLUTION INTRAMUSCULAR; INTRAVENOUS at 22:51

## 2019-03-11 NOTE — ASSESSMENT & PLAN NOTE
· Patient presented with a right temporal headache x several weeks  · CTA Head and Neck showed artifactual filling defects  · MRV/MRA of the Head was normal   · ESR normal   · Appreciate neurology input  · Started on Topamax 25mg po daily with plan for gradual taper/increase to 50mg po BID  · Migraine Protocol  · Follow up pending Sjogren's, thrombosis panel, anti-ribonucleic acid ab

## 2019-03-11 NOTE — PROGRESS NOTES
[facility]  Post admission check- Dayna Bence 1975, 39 y o  female MRN: [id number]    Unit/Bed#: -01 Encounter: [id number]    Primary Care Provider: Jewelene Buerger, MD   Date and time admitted to hospital: 3/9/2019 11:20 PM    * Bilateral carotid artery stenosis  Assessment & Plan  · consult neurology  · Recommending MRV of cerebral venous sinuses, d-Dimer, benefit from Acetazolamide, will wait until after the MRV  · Past complaining of Headache, likely venous sinus thrombosis  · Will evaluate for hypercoagulable state as well  · Continue ASA, statin  · Lipid profile completed LDL elevated otherwise unremarkable  · -MRI brain    Hemicrania continua  Assessment & Plan  · PRN medication  · send ESR/CRP to r/o Temporal artery arteritis      Neurology reviewed MRA/MRV an MRI of brain, no acute findings Topamax 25 mg once daily started with tapering up to 50 mg twice daily    Migraine protocol  Follow-up with Neurology outpatient within 2 weeks of discharge    Cardiology saw patient for palpitations, awaiting echocardiogram, continue tele  Continue atorvastatin,   No CVA finding so decreased atorvastatin to 40 mg daily

## 2019-03-11 NOTE — PLAN OF CARE
Problem: Potential for Falls  Goal: Patient will remain free of falls  Description  INTERVENTIONS:  - Assess patient frequently for physical needs  -  Identify cognitive and physical deficits and behaviors that affect risk of falls    -  [place] fall precautions as indicated by assessment   - Educate patient/family on patient safety including physical limitations  - Instruct patient to call for assistance with activity based on assessment  - Modify environment to reduce risk of injury  - Consider OT/PT consult to assist with strengthening/mobility  Outcome: Progressing     Problem: PAIN - ADULT  Goal: Verbalizes/displays adequate comfort level or baseline comfort level  Description  Interventions:  - Encourage patient to monitor pain and request assistance  - Assess pain using appropriate pain scale  - Administer analgesics based on type and severity of pain and evaluate response  - Implement non-pharmacological measures as appropriate and evaluate response  - Consider cultural and social influences on pain and pain management  - Notify physician/advanced practitioner if interventions unsuccessful or patient reports new pain  Outcome: Progressing     Problem: INFECTION - ADULT  Goal: Absence or prevention of progression during hospitalization  Description  INTERVENTIONS:  - Assess and monitor for signs and symptoms of infection  - Monitor lab/diagnostic results  - Monitor all insertion sites, i e  indwelling lines, tubes, and drains  - Monitor endotracheal (as able) and nasal secretions for changes in amount and color  - [place] appropriate cooling/warming therapies per order  - Administer medications as ordered  - Instruct and encourage patient and family to use good hand hygiene technique  - Identify and instruct in appropriate isolation precautions for identified infection/condition  Outcome: Progressing  Goal: Absence of fever/infection during neutropenic period  Description  INTERVENTIONS:  - Monitor WBC  - Implement neutropenic guidelines  Outcome: Progressing     Problem: SAFETY ADULT  Goal: Patient will remain free of falls  Description  INTERVENTIONS:  - Assess patient frequently for physical needs  -  Identify cognitive and physical deficits and behaviors that affect risk of falls    -  [place] fall precautions as indicated by assessment   - Educate patient/family on patient safety including physical limitations  - Instruct patient to call for assistance with activity based on assessment  - Modify environment to reduce risk of injury  - Consider OT/PT consult to assist with strengthening/mobility  Outcome: Progressing  Goal: Maintain or return to baseline ADL function  Description  INTERVENTIONS:  -  Assess patient's ability to carry out ADLs; assess patient's baseline for ADL function and identify physical deficits which impact ability to perform ADLs (bathing, care of mouth/teeth, toileting, grooming, dressing, etc )  - Assess/evaluate cause of self-care deficits   - Assess range of motion  - Assess patient's mobility; develop plan if impaired  - Assess patient's need for assistive devices and provide as appropriate  - Encourage maximum independence but intervene and supervise when necessary  ¯ Involve family in performance of ADLs  ¯ Assess for home care needs following discharge   ¯ Request OT consult to assist with ADL evaluation and planning for discharge  ¯ Provide patient education as appropriate  Outcome: Progressing  Goal: Maintain or return mobility status to optimal level  Description  INTERVENTIONS:  - Assess patient's baseline mobility status (ambulation, transfers, stairs, etc )    - Identify cognitive and physical deficits and behaviors that affect mobility  - Identify mobility aids required to assist with transfers and/or ambulation (gait belt, sit-to-stand, lift, walker, cane, etc )  - [place] fall precautions as indicated by assessment  - Record patient progress and toleration of activity level on Mobility SBAR; progress patient to next Phase/Stage  - Instruct patient to call for assistance with activity based on assessment  - Request Rehabilitation consult to assist with strengthening/weightbearing, etc   Outcome: Progressing     Problem: DISCHARGE PLANNING  Goal: Discharge to home or other facility with appropriate resources  Description  INTERVENTIONS:  - Identify barriers to discharge w/patient and caregiver  - Arrange for needed discharge resources and transportation as appropriate  - Identify discharge learning needs (meds, wound care, etc )  - Arrange for interpretive services to assist at discharge as needed  - Refer to Case Management Department for coordinating discharge planning if the patient needs post-hospital services based on physician/advanced practitioner order or complex needs related to functional status, cognitive ability, or social support system  Outcome: Progressing     Problem: Knowledge Deficit  Goal: Patient/family/caregiver demonstrates understanding of disease process, treatment plan, medications, and discharge instructions  Description  Complete learning assessment and assess knowledge base  Interventions:  - Provide teaching at level of understanding  - Provide teaching via preferred learning methods  Outcome: Progressing     Problem: NEUROSENSORY - ADULT  Goal: Achieves stable or improved neurological status  Description  INTERVENTIONS  - Monitor and report changes in neurological status  - Initiate measures to prevent increased intracranial pressure  - Maintain blood pressure and fluid volume within ordered parameters to optimize cerebral perfusion  - Monitor temperature, glucose, and sodium or any other associated labs   Initiate appropriate interventions as ordered  - Monitor for seizure activity   - Administer anti-seizure medications as ordered  Outcome: Progressing  Goal: Absence of seizures  Description  INTERVENTIONS  - Monitor for seizure activity  - Administer anti-seizure medications as ordered  - Monitor neurological status  Outcome: Progressing  Goal: Remains free of injury related to seizures activity  Description  INTERVENTIONS  - Maintain airway, patient safety  and administer oxygen as ordered  - Monitor patient for seizure activity, document and report duration and description of seizure to physician/advanced practitioner  - If seizure occurs,  ensure patient safety during seizure  - Reorient patient post seizure  - Seizure pads on all 4 side rails  - Instruct patient/family to notify RN of any seizure activity including if an aura is experienced  - Instruct patient/family to call for assistance with activity based on nursing assessment  - Administer anti-seizure medications as ordered  - Monitor fetal well being  Outcome: Progressing  Goal: Achieves maximal functionality and self care  Description  INTERVENTIONS  - Monitor swallowing and airway patency with patient fatigue and changes in neurological status  - Encourage and assist patient to increase activity and self care with guidance from rehab services  - Encourage visually impaired, hearing impaired and aphasic patients to use assistive/communication devices  Outcome: Progressing     Problem: Neurological Deficit  Goal: Neurological status is stable or improving  Description  Interventions:  - Monitor and assess patient's level of consciousness, motor function, sensory function, and level of assistance needed for ADLs  - Monitor and report changes from baseline  Collaborate with interdisciplinary team to initiate plan and implement interventions as ordered  - Provide and maintain a safe environment  - Utilize seizure precautions  - Utilize fall precautions  - Utilize aspiration precautions  - Utilize bleeding precautions  Outcome: Progressing     Problem:  Activity Intolerance/Impaired Mobility  Goal: Mobility/activity is maintained at optimum level for patient  Description  Interventions:  - Assess and monitor patient  barriers to mobility and need for assistive/adaptive devices  - Assess patient's emotional response to limitations  - Collaborate with interdisciplinary team and initiate plans and interventions as ordered  - Encourage independent activity per ability   - Maintain proper body alignment  - Perform active/passive rom as tolerated/ordered  - Plan activities to conserve energy   - Turn patient  Outcome: Progressing     Problem: Communication Impairment  Goal: Ability to express needs and understand communication  Description  Assess patient's communication skills and ability to understand information  Patient will demonstrate use of effective communication techniques, alternative methods of communication and understanding even if not able to speak  - Encourage communication and provide alternate methods of communication as needed  - Collaborate with case management/ for discharge needs  - Include patient/family/caregiver in decisions related to communication  Outcome: Progressing     Problem: Potential for Aspiration  Goal: Non-ventilated patient's risk of aspiration is minimized  Description  Assess and monitor vital signs, respiratory status, and labs (WBC)  Monitor for signs of aspiration (tachypnea, cough, rales, wheezing, cyanosis, fever)  - Assess and monitor patient's ability to swallow  - Place patient up in chair to eat if possible  - HOB up at 90 degrees to eat if unable to get patient up into chair   - Supervise patient during oral intake  - Instruct patient to take small bites  - Instruct patient to take small single sips when taking liquids  - Follow patient-specific strategies generated by speech pathologist   Outcome: Progressing  Goal: Ventilated patient's risk of aspiration is minimized  Description  Assess and monitor vital signs, respiratory status, airway cuff pressure, and labs (WBC)    Monitor for signs of aspiration (tachypnea, cough, rales, wheezing, cyanosis, fever)  - Elevate head of bed 30 degrees if patient has tube feeding   - Monitor tube feeding  Outcome: Progressing     Problem: Nutrition  Goal: Nutrition/Hydration status is improving  Description  Monitor and assess patient's nutrition/hydration status for malnutrition (ex- brittle hair, bruises, dry skin, pale skin and conjunctiva, muscle wasting, smooth red tongue, and disorientation)  Collaborate with interdisciplinary team and initiate plan and interventions as ordered  Monitor patient's weight and dietary intake as ordered or per policy  Utilize nutrition screening tool and intervene per policy  Determine patient's food preferences and provide high-protein, high-caloric foods as appropriate  - Assist patient with eating   - Allow adequate time for meals   - Encourage patient to take dietary supplement as ordered  - Collaborate with clinical nutritionist   - Include patient/family/caregiver in decisions related to nutrition  Outcome: Progressing     Problem: Nutrition/Hydration-ADULT  Goal: Nutrient/Hydration intake appropriate for improving, restoring or maintaining nutritional needs  Description  Monitor and assess patient's nutrition/hydration status for malnutrition (ex- brittle hair, bruises, dry skin, pale skin and conjunctiva, muscle wasting, smooth red tongue, and disorientation)  Collaborate with interdisciplinary team and initiate plan and interventions as ordered  Monitor patient's weight and dietary intake as ordered or per policy  Utilize nutrition screening tool and intervene per policy  Determine patient's food preferences and provide high-protein, high-caloric foods as appropriate       INTERVENTIONS:  - Monitor oral intake, urinary output, labs, and treatment plans  - Assess nutrition and hydration status and recommend course of action  - Evaluate amount of meals eaten  - Assist patient with eating if necessary - Allow adequate time for meals  - Recommend/ encourage appropriate diets, oral nutritional supplements, and vitamin/mineral supplements  - Order, calculate, and assess calorie counts as needed  - Recommend, monitor, and adjust tube feedings and TPN/PPN based on assessed needs  - Assess need for intravenous fluids  - Provide specific nutrition/hydration education as appropriate  - Include patient/family/caregiver in decisions related to nutrition  Outcome: Progressing

## 2019-03-11 NOTE — PROGRESS NOTES
General Cardiology   Progress Note   Richard Dockery 39 y o  female MRN: [id number]  Unit/Bed#: -01 Encounter: [id number]        Subjective:   Palpitations have improved  Echocardiogram is unremarkable    Objective:   Vitals:  Vitals:    19 1540   BP: 131/84   Pulse: 88   Resp: 18   Temp: 97 9 °F (36 6 °C)   SpO2: 99%       Body mass index is 27 75 kg/m²  Systolic (10PCE), GG , Min:114 , MRD:925     Diastolic (05DYR), MRY:66, Min:77, Max:84      Intake/Output Summary (Last 24 hours) at 3/11/2019 1858  Last data filed at 3/11/2019 1323  Gross per 24 hour   Intake 520 ml   Output 600 ml   Net -80 ml     Weight (last 2 days)     Date/Time   Weight    19 1207   78 (171 96)    03/10/19 0030   78 (172)    19 2318   78 (172)              Telemetry Review: No significant arrhythmias seen on telemetry review  PHYSICAL EXAMS:  General:  Patient is not in acute distress, laying in the bed comfortably, awake, alert responding to commands  Head: Normocephalic, Atraumatic  HEENT: White sclera, pink conjunctiva,  PERRLA,pharynx benign  Neck:  Supple, no neck vein distention, carotids+2/+2 no bruits, thyromegaly, adenopathy  Respiratory: clear to P/A  Cardiovascular:  PMI normal, S1-S2 normal, No  Murmurs, thrills, gallops, rubs   Regular rhythm  GI:  Abdomen soft nontender   No hepatosplenomegaly, adenopathy, ascites,or rebound tenderness  Extremities: No edema, normal pulses, no calf tenderness, no joint deformities, no venous disease   Integument:  No skin rashes or ulceration  Lymphatic:  No cervical or inguinal lymphadenopathy  Neurologic:  Patient is awake alert, responding to command, well-oriented to time and place and person moving all extremities      LABORATORY RESULTS:  Results from last 7 days   Lab Units 03/10/19  0025   TROPONIN I ng/mL <0 02     CBC with diff:   Results from last 7 days   Lab Units 19  0603 03/10/19  0813 03/10/19  0025   WBC Thousand/uL 6 48  --  7 94 HEMOGLOBIN g/dL 11 0*  --  11 6   HEMATOCRIT % 37 2  --  37 8   MCV fL 82  --  81*   PLATELETS Thousands/uL 392* 353 403*   MCH pg 24 2*  --  24 9*   MCHC g/dL 29 6*  --  30 7*   RDW % 17 2*  --  17 0*   MPV fL 9 7 9 9 9 8   NRBC AUTO /100 WBCs 0  --  0       CMP:  Results from last 7 days   Lab Units 19  0603 03/10/19  0025   POTASSIUM mmol/L 3 8 3 7   CHLORIDE mmol/L 102 100   CO2 mmol/L 25 26   BUN mg/dL 12 16   CREATININE mg/dL 0 76 0 79   CALCIUM mg/dL 8 8 9 1   AST U/L  --  17   ALT U/L  --  30   ALK PHOS U/L  --  55   EGFR ml/min/1 73sq m 96 91       BMP:  Results from last 7 days   Lab Units 19  0603 03/10/19  0025   POTASSIUM mmol/L 3 8 3 7   CHLORIDE mmol/L 102 100   CO2 mmol/L 25 26   BUN mg/dL 12 16   CREATININE mg/dL 0 76 0 79   CALCIUM mg/dL 8 8 9 1            Results from last 7 days   Lab Units 03/10/19  0025   MAGNESIUM mg/dL 1 9     Results from last 7 days   Lab Units 19  0603 03/10/19  0813   HEMOGLOBIN A1C % 6 6* 6 7*     Results from last 7 days   Lab Units 03/10/19  0025   TSH 3RD GENERATON uIU/mL 1 692           Lipid Profile:   No results found for: CHOL  Lab Results   Component Value Date    HDL 40 2019    HDL 39 (L) 2019    HDL 43 03/10/2019     Lab Results   Component Value Date    LDLCALC 124 (H) 2019    LDLCALC 123 (H) 2019    LDLCALC 122 (H) 03/10/2019     Lab Results   Component Value Date    TRIG 130 2019    TRIG 139 2019    TRIG 125 03/10/2019       Cardiac testing:   Results for orders placed during the hospital encounter of 19   Echo complete with contrast if indicated    Narrative [address]  [place], [place] [address] [phone]    Transthoracic Echocardiogram  2D, M-mode, Doppler, and Color Doppler    Study date:  11-Mar-2019    Patient: Kelly Caldwell  MR number: [id number]  Account number: [de-identified]  : 1975  Age: 39 years  Gender: Female  Status: Outpatient  Location: Bedside  Height: 66 in  Weight: 172 lb  BP: 114/ 77 mmHg    Indications: Cerebral thrombosis, embolism, artery occlusion    Diagnoses: I74 9 - Embolism and thrombosis of unspecified artery    Sonographer:  Catrachita Macias  Referring Physician:  Sharon Leyva MD  Group:  [facility]  Interpreting Physician:  Ash Vance MD    SUMMARY    LEFT VENTRICLE:  Systolic function was normal  Ejection fraction was estimated in the range of 55 % to 65 %  There were no regional wall motion abnormalities  MITRAL VALVE:  There was mild regurgitation  TRICUSPID VALVE:  There was trace regurgitation  HISTORY: PRIOR HISTORY: Patient has no history of cardiovascular disease  PROCEDURE: The procedure was performed at the bedside  This was a routine study  The transthoracic approach was used  The study included complete 2D imaging, M-mode, complete spectral Doppler, and color Doppler  The heart rate was 75 bpm,  at the start of the study  Images were obtained from the parasternal, apical, subcostal, and suprasternal notch acoustic windows  Image quality was adequate  LEFT VENTRICLE: Size was normal  Systolic function was normal  Ejection fraction was estimated in the range of 55 % to 65 %  There were no regional wall motion abnormalities  Wall thickness was normal  DOPPLER: Left ventricular diastolic  function parameters were normal     RIGHT VENTRICLE: The size was normal  Systolic function was normal  Wall thickness was normal     LEFT ATRIUM: Size was normal     RIGHT ATRIUM: Size was normal     MITRAL VALVE: Valve structure was normal  There was normal leaflet separation  DOPPLER: The transmitral velocity was within the normal range  There was no evidence for stenosis  There was mild regurgitation  AORTIC VALVE: The valve was trileaflet  Leaflets exhibited normal thickness and normal cuspal separation  DOPPLER: Transaortic velocity was within the normal range  There was no evidence for stenosis  There was no regurgitation  TRICUSPID VALVE: The valve structure was normal  There was normal leaflet separation  DOPPLER: The transtricuspid velocity was within the normal range  There was no evidence for stenosis  There was trace regurgitation  PULMONIC VALVE: Leaflets exhibited normal thickness, no calcification, and normal cuspal separation  DOPPLER: The transpulmonic velocity was within the normal range  There was no regurgitation  PERICARDIUM: There was no pericardial effusion  The pericardium was normal in appearance  AORTA: The root exhibited normal size  SYSTEMIC VEINS: IVC: The inferior vena cava was normal in size and course  Respirophasic changes were normal     SYSTEM MEASUREMENT TABLES    2D  %FS: 35 %  Ao Diam: 3 1 cm  EDV(Teich): 97 5 ml  EF(Teich): 64 3 %  ESV(Teich): 34 8 ml  IVSd: 0 7 cm  LA Area: 17 2 cm2  LA Diam: 3 4 cm  LVEDV MOD A4C: 100 1 ml  LVEF MOD A4C: 57 4 %  LVESV MOD A4C: 42 6 ml  LVIDd: 4 6 cm  LVIDs: 3 cm  LVLd A4C: 7 8 cm  LVLs A4C: 6 6 cm  LVPWd: 0 7 cm  RA Area: 16 9 cm2  RVIDd: 3 8 cm  SV MOD A4C: 57 5 ml  SV(Teich): 62 7 ml    CW  TR Vmax: 2 1 m/s  TR maxP 1 mmHg    MM  TAPSE: 2 cm    PW  E': 0 1 m/s  E/E': 7 7  MV A Rashad: 0 7 m/s  MV Dec [place]: 2 8 m/s2  MV DecT: 281 8 ms  MV E Rashad: 0 8 m/s  MV E/A Ratio: 1 1  MV PHT: 81 7 ms  MVA By PHT: 2 7 cm2    [employer] Accredited Echocardiography Laboratory    Prepared and electronically signed by    Miguel Angel Fragoso MD  Signed 11-Mar-2019 14:34:07       No results found for this or any previous visit  No results found for this or any previous visit  No procedure found  No results found for this or any previous visit  Meds/Allergies   all current active meds have been reviewed  Medications Prior to Admission   Medication    ALBUTEROL IN    predniSONE 20 mg tablet            Assessment/Plan:  1   Palpitations/dizziness  No arrhythmias on telemetry  Echocardiogram did not reveal any structural heart disease  If she has any recurrence of dizziness associated with palpitations, will consider outpatient event monitor  Patient has been asymptomatic while she is inpatient      2  Hyperlipidemia,   On statins    Patient is stable from cardiac standpoint  Will follow as needed  Call us if you have any questions  Discuss the results of echocardiogram with the patient  All questions answered    Counseling / Coordination of Care  Total floor / unit time spent today 25 minutes  Greater than 50% of total time was spent with the patient and / or family counseling and / or coordination of care  ** Please Note: Dragon 360 Dictation voice to text software may have been used in the creation of this document   **

## 2019-03-11 NOTE — OCCUPATIONAL THERAPY NOTE
Occupational Therapy Evaluation        Patient Name: Kira Quintanilla  Today's Date: 3/11/2019       03/11/19 0709   Note Type   Note type Eval only   Restrictions/Precautions   Weight Bearing Precautions Per Order No   Braces or Orthoses Other (Comment)  (None reported)   Other Precautions Pain;Telemetry   Pain Assessment   Pain Assessment 0-10   Pain Score 5   Pain Type Acute pain   Pain Location Head   Pain Orientation Right   Clinical Progression Gradually improving   Home Living   Type of [address] Two level;Stairs to enter with rails;Bed/bath upstairs;1/2 bath on main level  (5 JANI)   Bathroom Shower/Tub Walk-in shower   Bathroom Toilet Standard   Bathroom Equipment Built-in shower seat   Bathroom Accessibility Accessible   Home Equipment Other (Comment)  (None at baseline)   Prior Function   Level of [place] Independent with ADLs and functional mobility   Lives With [employer] Help From Family   ADL Assistance Independent   IADLs Independent   Falls in the last 6 months 0   Comments patient drives   Lifestyle   Autonomy Patient reporting indepependent with ADLs/IADLs, ambulatory with no AD, Lives with family in a 2 story house, 5 JANI and full flight to 2nd floor bedroom, patient drives and manages her affairs independently     Psychosocial   Psychosocial (WDL) WDL   ADL   Eating Assistance 7  Independent   Grooming Assistance 6  Modified Independent   UB Bathing Assistance 6  Modified Independent   LB Bathing Assistance 6  Modified Independent   UB Dressing Assistance 7  Independent   LB Dressing Assistance 6  Modified independent   Toileting Assistance  6  Modified independent   Functional Assistance 6  Modified independent   Bed Mobility   Supine to Sit 6  Modified independent   Additional items HOB elevated   Sit to Supine 6  Modified independent   Additional items HOB elevated   Transfers   Sit to Stand 7  Independent   Stand to Sit 7  Independent   Functional Mobility Functional Mobility 6  Modified independent   Balance   Static Sitting Normal   Dynamic Sitting Normal   Static Standing Normal   Dynamic Standing Good   Activity Tolerance   Activity Tolerance Patient tolerated treatment well   Nurse Made Aware MOISES Cow[facility] verbalized patient appropriate to see   RUE Assessment   RUE Assessment WFL   LUE Assessment   LUE Assessment WFL   Hand Function   Gross Motor Coordination Functional   Fine Motor Coordination Functional   Sensation   Light Touch No apparent deficits   Vision-Basic Assessment   Current Vision Does not wear glasses   Cognition   Overall Cognitive Status WFL   Arousal/Participation Alert; Responsive; Cooperative   Attention Within functional limits   Orientation Level Oriented X4   Memory Within functional limits   Following Commands Follows all commands and directions without difficulty   Assessment   Assessment Patient is a 39 y o  female seen for OT evaluation s/p admit to [id number] [facility] on 3/9/2019 w/Bilateral carotid artery stenosis  Commorbidities affecting patient's functional performance at time of assessment include: Bilateral carotid stenosis, Asthma, Chron's disease  Orders placed for OT evaluation and treatment   Performed at least two patient identifiers during session including name and wristband  Prior to admission,  Patient reporting indepependent with ADLs/IADLs, ambulatory with no AD, Lives with family in a 2 story house, 5 JANI and full flight to 2nd floor bedroom, patient drives and manages her affairs independently  Upon evaluation, patient requires independent assist for UB ADLs, modified independent assist for LB ADLs, transfers and functional ambulation in room and bathroom with modified independent assist, with no assistive device  Presents with functional use of BUEs, with intact prehension, coordination and symmetrical muscle strength  Patient appears to be functioning at baseline   No further acute OT needs identified at this time to warrant continuation of services  D/C OT services  From OT standpoint, recommendation at time of d/c would be Home with family support      Recommendation   OT Discharge Recommendation Home with family support   Barthel Index   Feeding 10   Bathing 5   Grooming Score 5   Dressing Score 10   Bladder Score 10   Bowels Score 10   Toilet Use Score 10   Transfers (Bed/Chair) Score 15   Mobility (Level Surface) Score 15   Stairs Score 10   Barthel Index Score 100   Modified [place] Scale   Modified Leroy Scale 1

## 2019-03-11 NOTE — ASSESSMENT & PLAN NOTE
· Patient reports intermittent palpitations  · Appreciate cardiology input  · On Telemetry  · For Echo

## 2019-03-11 NOTE — UTILIZATION REVIEW
Continued Stay Review    Date: 03/11/19  Vital Signs: /77 (BP Location: Right arm)   Pulse 90   Temp 98 4 °F (36 9 °C) (Oral)   Resp 18   Ht 5' 6" (1 676 m)   Wt 78 kg (171 lb 15 3 oz)   SpO2 98%   BMI 27 75 kg/m²   Assessment/Plan:   Headache  Assessment & Plan  · Patient presented with a right temporal headache x several weeks  · CTA Head and Neck showed artifactual filling defects  · MRV/MRA of the Head was normal   · ESR normal   · Appreciate neurology input  · Started on Topamax 25mg po daily with plan for gradual taper/increase to 50mg po BID  · Migraine Protocol  · Follow up pending Sjogren's, thrombosis panel, anti-ribonucleic acid ab  Asthma  Assessment & Plan  · Duonebs  Hyperlipidemia  Assessment & Plan  ·   · On Atorvastatin  Diabetes ([address] )  Assessment & Plan  Blood Sugar Average: Last 72 hrs:  HGB A1C 6 7 consistent with a diagnosis of diabetes  Discussed this with patient  Nutrition consult regarding carb consistent diet  Sliding scale coverage while in house  Monitor POCs  She needs follow up with her PCP as outpatient for further management  Palpitations  Assessment & Plan  · Patient reports intermittent palpitations  · Appreciate cardiology input  · On Telemetry  · For Echo  VTE Pharmacologic Prophylaxis:   Pharmacologic: Enoxaparin (Lovenox)  Mechanical VTE Prophylaxis in Place: Yes  Current Patient Status: Observation   Certification Statement: The patient will continue to require additional inpatient hospital stay due to further evaluation with echo pending  Discharge Plan: Not medically cleared    Medications:   Scheduled Meds:   Current Facility-Administered Medications:  aspirin 81 mg Oral Daily   atorvastatin 40 mg Oral Daily With Dinner   diphenhydrAMINE 25 mg Intravenous Q8H PRN   enoxaparin 40 mg Subcutaneous Daily   insulin lispro 1-5 Units Subcutaneous TID AC   insulin lispro 1-5 Units Subcutaneous HS   ipratropium-albuterol 3 mL Nebulization Q6H ketorolac 30 mg Intravenous Q12H BERNARDINO   metoclopramide 10 mg Intravenous Q8H [address]   topiramate 25 mg Oral BID     Continuous Infusions:    PRN Meds: diphenhydrAMINE  Pertinent Labs/Diagnostic Results: HGB 11 0, , HGBA1C 6 6  3/10 MRI BRAIN:  Unremarkable MRI of the brain  No acute intracranial pathology  Age/Sex: 39 y o  female   Discharge Plan: TBD    Network Utilization Review Department  Phone: [phone]; Fax [phone]  [email or web address]  ATTENTION: Please call with any questions or concerns to [phone]  and carefully listen to the prompts so that you are directed to the right person  Send all requests for admission clinical reviews, approved or denied determinations and any other requests to fax [phone]   All voicemails are confidential

## 2019-03-11 NOTE — PLAN OF CARE
Problem: Potential for Falls  Goal: Patient will remain free of falls  Description  INTERVENTIONS:  - Assess patient frequently for physical needs  -  Identify cognitive and physical deficits and behaviors that affect risk of falls    -  [place] fall precautions as indicated by assessment   - Educate patient/family on patient safety including physical limitations  - Instruct patient to call for assistance with activity based on assessment  - Modify environment to reduce risk of injury  - Consider OT/PT consult to assist with strengthening/mobility  Outcome: Progressing     Problem: PAIN - ADULT  Goal: Verbalizes/displays adequate comfort level or baseline comfort level  Description  Interventions:  - Encourage patient to monitor pain and request assistance  - Assess pain using appropriate pain scale  - Administer analgesics based on type and severity of pain and evaluate response  - Implement non-pharmacological measures as appropriate and evaluate response  - Consider cultural and social influences on pain and pain management  - Notify physician/advanced practitioner if interventions unsuccessful or patient reports new pain  Outcome: Progressing     Problem: INFECTION - ADULT  Goal: Absence or prevention of progression during hospitalization  Description  INTERVENTIONS:  - Assess and monitor for signs and symptoms of infection  - Monitor lab/diagnostic results  - Monitor all insertion sites, i e  indwelling lines, tubes, and drains  - Monitor endotracheal (as able) and nasal secretions for changes in amount and color  - [place] appropriate cooling/warming therapies per order  - Administer medications as ordered  - Instruct and encourage patient and family to use good hand hygiene technique  - Identify and instruct in appropriate isolation precautions for identified infection/condition  Outcome: Progressing  Goal: Absence of fever/infection during neutropenic period  Description  INTERVENTIONS:  - Monitor WBC  - Implement neutropenic guidelines  Outcome: Progressing     Problem: SAFETY ADULT  Goal: Patient will remain free of falls  Description  INTERVENTIONS:  - Assess patient frequently for physical needs  -  Identify cognitive and physical deficits and behaviors that affect risk of falls    -  [place] fall precautions as indicated by assessment   - Educate patient/family on patient safety including physical limitations  - Instruct patient to call for assistance with activity based on assessment  - Modify environment to reduce risk of injury  - Consider OT/PT consult to assist with strengthening/mobility  Outcome: Progressing  Goal: Maintain or return to baseline ADL function  Description  INTERVENTIONS:  -  Assess patient's ability to carry out ADLs; assess patient's baseline for ADL function and identify physical deficits which impact ability to perform ADLs (bathing, care of mouth/teeth, toileting, grooming, dressing, etc )  - Assess/evaluate cause of self-care deficits   - Assess range of motion  - Assess patient's mobility; develop plan if impaired  - Assess patient's need for assistive devices and provide as appropriate  - Encourage maximum independence but intervene and supervise when necessary  ¯ Involve family in performance of ADLs  ¯ Assess for home care needs following discharge   ¯ Request OT consult to assist with ADL evaluation and planning for discharge  ¯ Provide patient education as appropriate  Outcome: Progressing  Goal: Maintain or return mobility status to optimal level  Description  INTERVENTIONS:  - Assess patient's baseline mobility status (ambulation, transfers, stairs, etc )    - Identify cognitive and physical deficits and behaviors that affect mobility  - Identify mobility aids required to assist with transfers and/or ambulation (gait belt, sit-to-stand, lift, walker, cane, etc )  - [place] fall precautions as indicated by assessment  - Record patient progress and toleration of activity level on Mobility SBAR; progress patient to next Phase/Stage  - Instruct patient to call for assistance with activity based on assessment  - Request Rehabilitation consult to assist with strengthening/weightbearing, etc   Outcome: Progressing     Problem: DISCHARGE PLANNING  Goal: Discharge to home or other facility with appropriate resources  Description  INTERVENTIONS:  - Identify barriers to discharge w/patient and caregiver  - Arrange for needed discharge resources and transportation as appropriate  - Identify discharge learning needs (meds, wound care, etc )  - Arrange for interpretive services to assist at discharge as needed  - Refer to Case Management Department for coordinating discharge planning if the patient needs post-hospital services based on physician/advanced practitioner order or complex needs related to functional status, cognitive ability, or social support system  Outcome: Progressing     Problem: Knowledge Deficit  Goal: Patient/family/caregiver demonstrates understanding of disease process, treatment plan, medications, and discharge instructions  Description  Complete learning assessment and assess knowledge base  Interventions:  - Provide teaching at level of understanding  - Provide teaching via preferred learning methods  Outcome: Progressing     Problem: NEUROSENSORY - ADULT  Goal: Achieves stable or improved neurological status  Description  INTERVENTIONS  - Monitor and report changes in neurological status  - Initiate measures to prevent increased intracranial pressure  - Maintain blood pressure and fluid volume within ordered parameters to optimize cerebral perfusion  - Monitor temperature, glucose, and sodium or any other associated labs   Initiate appropriate interventions as ordered  - Monitor for seizure activity   - Administer anti-seizure medications as ordered  Outcome: Progressing  Goal: Absence of seizures  Description  INTERVENTIONS  - Monitor for seizure activity  - Administer anti-seizure medications as ordered  - Monitor neurological status  Outcome: Progressing  Goal: Remains free of injury related to seizures activity  Description  INTERVENTIONS  - Maintain airway, patient safety  and administer oxygen as ordered  - Monitor patient for seizure activity, document and report duration and description of seizure to physician/advanced practitioner  - If seizure occurs,  ensure patient safety during seizure  - Reorient patient post seizure  - Seizure pads on all 4 side rails  - Instruct patient/family to notify RN of any seizure activity including if an aura is experienced  - Instruct patient/family to call for assistance with activity based on nursing assessment  - Administer anti-seizure medications as ordered  - Monitor fetal well being  Outcome: Progressing  Goal: Achieves maximal functionality and self care  Description  INTERVENTIONS  - Monitor swallowing and airway patency with patient fatigue and changes in neurological status  - Encourage and assist patient to increase activity and self care with guidance from rehab services  - Encourage visually impaired, hearing impaired and aphasic patients to use assistive/communication devices  Outcome: Progressing     Problem: Neurological Deficit  Goal: Neurological status is stable or improving  Description  Interventions:  - Monitor and assess patient's level of consciousness, motor function, sensory function, and level of assistance needed for ADLs  - Monitor and report changes from baseline  Collaborate with interdisciplinary team to initiate plan and implement interventions as ordered  - Provide and maintain a safe environment  - Utilize seizure precautions  - Utilize fall precautions  - Utilize aspiration precautions  - Utilize bleeding precautions  Outcome: Progressing     Problem:  Activity Intolerance/Impaired Mobility  Goal: Mobility/activity is maintained at optimum level for patient  Description  Interventions:  - Assess and monitor patient  barriers to mobility and need for assistive/adaptive devices  - Assess patient's emotional response to limitations  - Collaborate with interdisciplinary team and initiate plans and interventions as ordered  - Encourage independent activity per ability   - Maintain proper body alignment  - Perform active/passive rom as tolerated/ordered  - Plan activities to conserve energy   - Turn patient  Outcome: Progressing     Problem: Communication Impairment  Goal: Ability to express needs and understand communication  Description  Assess patient's communication skills and ability to understand information  Patient will demonstrate use of effective communication techniques, alternative methods of communication and understanding even if not able to speak  - Encourage communication and provide alternate methods of communication as needed  - Collaborate with case management/ for discharge needs  - Include patient/family/caregiver in decisions related to communication  Outcome: Progressing     Problem: Potential for Aspiration  Goal: Non-ventilated patient's risk of aspiration is minimized  Description  Assess and monitor vital signs, respiratory status, and labs (WBC)  Monitor for signs of aspiration (tachypnea, cough, rales, wheezing, cyanosis, fever)  - Assess and monitor patient's ability to swallow  - Place patient up in chair to eat if possible  - HOB up at 90 degrees to eat if unable to get patient up into chair   - Supervise patient during oral intake  - Instruct patient to take small bites  - Instruct patient to take small single sips when taking liquids  - Follow patient-specific strategies generated by speech pathologist   Outcome: Progressing  Goal: Ventilated patient's risk of aspiration is minimized  Description  Assess and monitor vital signs, respiratory status, airway cuff pressure, and labs (WBC)    Monitor for signs of aspiration (tachypnea, cough, rales, wheezing, cyanosis, fever)  - Elevate head of bed 30 degrees if patient has tube feeding   - Monitor tube feeding  Outcome: Progressing     Problem: Nutrition  Goal: Nutrition/Hydration status is improving  Description  Monitor and assess patient's nutrition/hydration status for malnutrition (ex- brittle hair, bruises, dry skin, pale skin and conjunctiva, muscle wasting, smooth red tongue, and disorientation)  Collaborate with interdisciplinary team and initiate plan and interventions as ordered  Monitor patient's weight and dietary intake as ordered or per policy  Utilize nutrition screening tool and intervene per policy  Determine patient's food preferences and provide high-protein, high-caloric foods as appropriate  - Assist patient with eating   - Allow adequate time for meals   - Encourage patient to take dietary supplement as ordered  - Collaborate with clinical nutritionist   - Include patient/family/caregiver in decisions related to nutrition    Outcome: Progressing

## 2019-03-11 NOTE — NUTRITION
03/11/19 1208   Assessment   Timepoint Initial  (consult x2: stroke and DM diet education)   Labs   List Completed Labs   (3/10/2019: A1c 6 7, BG fasting 139, ; meds- lipitor, humalog, reglan)   Feeding Route   PO Independent   Adequacy of Intake   Nutrition Modality PO  (CCD 1, low cholesterol- no protocol)   Intake Meals 25-50%   Estimated calorie intake compared to estimated need pt states her appetite has not been great, sister at bedside stated that her sister has not been eating much and complains of nausea whenever food is provided  noted there are multiple snacks on bedside table (doritos, sodas, cake snacks) and [place] sandwiches as well  pt is not meeting estimated needs at this time   Nutrition Prognosis   Nutrition Concerns   (prolonged headache, no stroke dx- MRA, MRI, MRV unremarkable  no edema documented, skin intact)   Comorbid Concerns   (DM ,HLD)   Nutrition Considerations   (provided pt with stroke nutrition therapy information in stroke booklet and also provided DM diet education with information on CHO counting and portion sizes  )   PES Statement   Problem Behavioral-Environmental   Knowledge and Beliefs (1) Food- and nutrition-related knowledge deficit NB-1 1   Related to Other (comment)  (DM diet)   As evidenced by: Per patient interview   Patient Nutrition Goals   Goal comprehend education;meet PO needs   Goal Status initiated   Timeframe to complete goal by next f/u   Recommendations/Interventions   Summary Consulted for DM diet education and stoke  No stroke dx confirmed at this time  Provided diet education with stroke nutrition therapy from stroke booklet at bedside and also provided DM diet education with explanation and handouts on CHO counting and portion sizes  Pt and sister at bedside both state that intake has been suboptimal since admission   Will monitor intake and any further diet education questions prior to d/c   Malnutrition/BMI Present No   Interventions Diet: continued as ordered;Education initiated/completed   Nutrition Recommendations Continue diet as ordered   Nutrition Complexity Risk   Nutrition complexity level Low risk   Follow up date 03/21/19

## 2019-03-11 NOTE — ASSESSMENT & PLAN NOTE
Lab Results   Component Value Date    HGBA1C 6 7 (H) 03/10/2019       No results for input(s): POCGLU in the last 72 hours  Blood Sugar Average: Last 72 hrs:     HGB A1C 6 7 consistent with a diagnosis of diabetes  Discussed this with patient  Nutrition consult regarding carb consistent diet  Sliding scale coverage while in house  Monitor POCs  She needs follow up with her PCP as outpatient for further management

## 2019-03-11 NOTE — PROGRESS NOTES
Progress Note - Karen Gregorio 1975, 39 y o  female MRN: [id number]    Unit/Bed#: -01 Encounter: [id number]    Primary Care Provider: Jayda Cross MD   Date and time admitted to hospital: 3/9/2019 11:20 PM        Headache  Assessment & Plan  · Patient presented with a right temporal headache x several weeks  · CTA Head and Neck showed artifactual filling defects  · MRV/MRA of the Head was normal   · ESR normal   · Appreciate neurology input  · Started on Topamax 25mg po daily with plan for gradual taper/increase to 50mg po BID  · Migraine Protocol  · Follow up pending Sjogren's, thrombosis panel, anti-ribonucleic acid ab  Asthma  Assessment & Plan  · Duonebs  Hyperlipidemia  Assessment & Plan  ·   · On Atorvastatin  Diabetes [facility])  Assessment & Plan  Lab Results   Component Value Date    HGBA1C 6 7 (H) 03/10/2019       No results for input(s): POCGLU in the last 72 hours  Blood Sugar Average: Last 72 hrs:     HGB A1C 6 7 consistent with a diagnosis of diabetes  Discussed this with patient  Nutrition consult regarding carb consistent diet  Sliding scale coverage while in house  Monitor POCs  She needs follow up with her PCP as outpatient for further management  Palpitations  Assessment & Plan  · Patient reports intermittent palpitations  · Appreciate cardiology input  · On Telemetry  · For Echo  VTE Pharmacologic Prophylaxis:   Pharmacologic: Enoxaparin (Lovenox)  Mechanical VTE Prophylaxis in Place: Yes    Patient Centered Rounds: I discussed with patient's nurse outside of room  Discussions with Specialists or Other Care Team Provider: [address] neurology and cardiology input  Education and Discussions with Family / Patient: Discussed with patient and family at bedside  Time Spent for Care: 30 minutes  More than 50% of total time spent on counseling and coordination of care as described above      Current Length of Stay: 0 day(s)    Current Patient Status: Observation   Certification Statement: The patient will continue to require additional inpatient hospital stay due to further evaluation with echo pending  Discharge Plan: Not medically cleared  Code Status: Level 1 - Full Code      Subjective:   Patient reports continued headache - some improvement  No vision changes  No chest pain  No current palpitations but comes and goes  Objective:     Vitals:   Temp (24hrs), Av 2 °F (36 8 °C), Min:98 1 °F (36 7 °C), Max:98 4 °F (36 9 °C)    Temp:  [98 1 °F (36 7 °C)-98 4 °F (36 9 °C)] 98 3 °F (36 8 °C)  HR:  [] 97  Resp:  [14-18] 18  BP: (114-138)/(70-80) 114/77  SpO2:  [92 %-100 %] 97 %  Body mass index is 27 76 kg/m²  Input and Output Summary (last 24 hours):     No intake or output data in the 24 hours ending 19 0944    Physical Exam:     Physical Exam   Constitutional: She is oriented to person, place, and time  No distress  HENT:   Head: Normocephalic and atraumatic  Eyes: EOM are normal  No scleral icterus  Neck: Neck supple  Cardiovascular: Normal rate and regular rhythm  Pulmonary/Chest: Effort normal  No respiratory distress  Scant wheeze right lung field  Abdominal: Soft  Bowel sounds are normal  She exhibits no distension  There is no tenderness  Musculoskeletal: She exhibits no edema  Neurological: She is alert and oriented to person, place, and time  Skin: Skin is warm and dry  She is not diaphoretic  Vitals reviewed           Additional Data:     Labs:    Results from last 7 days   Lab Units 19  0603   WBC Thousand/uL 6 48   HEMOGLOBIN g/dL 11 0*   HEMATOCRIT % 37 2   PLATELETS Thousands/uL 392*   NEUTROS PCT % 61   LYMPHS PCT % 25   MONOS PCT % 7   EOS PCT % 5     Results from last 7 days   Lab Units 19  0603 03/10/19  0025   POTASSIUM mmol/L 3 8 3 7   CHLORIDE mmol/L 102 100   CO2 mmol/L 25 26   BUN mg/dL 12 16   CREATININE mg/dL 0 76 0 79   CALCIUM mg/dL 8 8 9 1   ALK PHOS U/L --  55   ALT U/L  --  30   AST U/L  --  17           * I Have Reviewed All Lab Data Listed Above  * Additional Pertinent Lab Tests Reviewed: All Labs Within Last 24 Hours Reviewed    Imaging:    Imaging Reports Reviewed Today Include: MRV/MRA as noted above  Recent Cultures (last 7 days):           Last 24 Hours Medication List:     Current Facility-Administered Medications:  aspirin 81 mg Oral Daily Benita Johnson MD   atorvastatin 40 mg Oral Daily With SIPphone, CRNP   diphenhydrAMINE 25 mg Intravenous Q8H PRN Sandra Cobos MD   enoxaparin 40 mg Subcutaneous Daily Benita Johnson MD   insulin lispro 1-5 Units Subcutaneous TID AC Pilar Osorio PA-C   insulin lispro 1-5 Units Subcutaneous HS Carley Olivera PA-C   ipratropium-albuterol 3 mL Nebulization Q6H Carley Olivera PA-C   ketorolac 30 mg Intravenous Q12H Amira Davis MD   metoclopramide 10 mg Intravenous Q8H Amira Davis MD   topiramate 25 mg Oral BID Sandra Cobos MD        Today, Patient Was Seen By: Carley Olivera PA-C    ** Please Note: Dictation voice to text software may have been used in the creation of this document   **

## 2019-03-11 NOTE — PHYSICAL THERAPY NOTE
PT Evaluation (11min)  (8:30-8:41)    Past Medical History:   Diagnosis Date    Asthma     Crohn disease ([facility] [address] )         03/11/19 0841   Note Type   Note type Eval only   Pain Assessment   Pain Assessment 0-10   Pain Score 5   Pain Type Acute pain   Pain Location Head   Pain Orientation Right   Home Living   Type of Home House   Home Layout Two level;1/2 bath on main level;Stairs to enter with rails  (5 JANI)   Bathroom Shower/Tub Walk-in shower   Bathroom Toilet Standard   Bathroom Equipment Built-in shower seat   Bathroom Accessibility Accessible   Prior Function   Level of [place] Independent with ADLs and functional mobility   Lives With [employer] Help From Family   ADL Assistance Independent   IADLs Independent   Falls in the last 6 months 0   Comments (+) drives   Restrictions/Precautions   Other Precautions Telemetry;Pain   General   Additional Pertinent History pt presents to [facility] c headache x3wks  admitted to undergo workup to r/o acute CVA vs TIA  MRI unremarkable  PT consulted for mobility + d/c planning  up + OOB as tolerated  Family/Caregiver Present Yes   Cognition   Orientation Level Oriented X4   RUE Assessment   RUE Assessment WFL   LUE Assessment   LUE Assessment WFL   RLE Assessment   RLE Assessment WFL  (4+/5)   LLE Assessment   LLE Assessment WFL  (4+/5)   Coordination   Movements are Fluid and Coordinated 1   Sensation WFL   Bed Mobility   Supine to Sit 6  Modified independent   Additional items HOB elevated   Sit to Supine 6  Modified independent   Additional items HOB elevated   Transfers   Sit to Stand 7  Independent   Stand to Sit 7  Independent   Ambulation/Elevation   Gait pattern Inconsistent kalyan   Gait Assistance 7  Independent   Assistive Device None   Distance 50'x2 c rest for stair training   Stair Management Assistance 6  Modified independent   Stair Management Technique One rail R;Foreward; Alternating pattern   Number of Stairs 14   Balance   Static Sitting Normal   Dynamic Sitting Normal   Static Standing Fair +   Dynamic Standing Fair +   Ambulatory Fair +   Activity Tolerance   Activity Tolerance Patient limited by pain; Patient limited by fatigue   Nurse Made Aware Corazon Mai   Assessment   Prognosis Good   Problem List Decreased strength;Decreased endurance; Impaired balance;Decreased mobility;Pain   Assessment pt is a 42y/o f who presents to [facility] c headache x3wks  admitted to r/o acute CVA vs TIA  MRI unremarkable at this time  PMH significant for crohn's disease, asthma, DM, hemicrani continua  at baseline, pt (I) c functional mobility s AD  resides c family in 2 story home c 5 JANI + full flight of stairs to 2nd  floor  presents c deficits in strength, balance, gait quality, pain, + activity tolerance noted in PT exam above  Barthel Index 100/100  despite above impairments, pt able to complete mobility tasks mod (I)/(I)  ambulated 50'x2 c rest for stair training + negotiated full flight of stairs in stairwell s difficulty  at this time, pt has no further skilled PT needs  recommend pt return home c family support once medically cleared by MD  PT eval of high complexity 2* unstable med status c pt requiring ongoing medical management 2* headache  cont to report 5/10 pain  presents c mobility deficits above along c co-morbidities including DM, crohn's disease, asthma, + hemicrani continua  Barriers to Discharge None   Goals   Patient Goals "to walk around s pain"     Plan   Treatment/Interventions Spoke to nursing;Family;OT   Recommendation   Recommendation D/C PT;Home with family support   PT - OK to Discharge Yes   Modified Leroy Scale   Modified Leroy Scale 1   Barthel Index   Feeding 10   Bathing 5   Grooming Score 5   Dressing Score 10   Bladder Score 10   Bowels Score 10   Toilet Use Score 10   Transfers (Bed/Chair) Score 15   Mobility (Level Surface) Score 15   Stairs Score 10   Barthel Index Score 100     Marciano Zamora, PT, DPT

## 2019-03-11 NOTE — PLAN OF CARE
Problem: Potential for Falls  Goal: Patient will remain free of falls  Description  INTERVENTIONS:  - Assess patient frequently for physical needs  -  Identify cognitive and physical deficits and behaviors that affect risk of falls    -  [place] fall precautions as indicated by assessment   - Educate patient/family on patient safety including physical limitations  - Instruct patient to call for assistance with activity based on assessment  - Modify environment to reduce risk of injury  - Consider OT/PT consult to assist with strengthening/mobility  Outcome: Progressing     Problem: PAIN - ADULT  Goal: Verbalizes/displays adequate comfort level or baseline comfort level  Description  Interventions:  - Encourage patient to monitor pain and request assistance  - Assess pain using appropriate pain scale  - Administer analgesics based on type and severity of pain and evaluate response  - Implement non-pharmacological measures as appropriate and evaluate response  - Consider cultural and social influences on pain and pain management  - Notify physician/advanced practitioner if interventions unsuccessful or patient reports new pain  Outcome: Progressing     Problem: INFECTION - ADULT  Goal: Absence or prevention of progression during hospitalization  Description  INTERVENTIONS:  - Assess and monitor for signs and symptoms of infection  - Monitor lab/diagnostic results  - Monitor all insertion sites, i e  indwelling lines, tubes, and drains  - Monitor endotracheal (as able) and nasal secretions for changes in amount and color  - [place] appropriate cooling/warming therapies per order  - Administer medications as ordered  - Instruct and encourage patient and family to use good hand hygiene technique  - Identify and instruct in appropriate isolation precautions for identified infection/condition  Outcome: Progressing  Goal: Absence of fever/infection during neutropenic period  Description  INTERVENTIONS:  - Monitor WBC  - Implement neutropenic guidelines  Outcome: Progressing     Problem: SAFETY ADULT  Goal: Patient will remain free of falls  Description  INTERVENTIONS:  - Assess patient frequently for physical needs  -  Identify cognitive and physical deficits and behaviors that affect risk of falls    -  [place] fall precautions as indicated by assessment   - Educate patient/family on patient safety including physical limitations  - Instruct patient to call for assistance with activity based on assessment  - Modify environment to reduce risk of injury  - Consider OT/PT consult to assist with strengthening/mobility  Outcome: Progressing  Goal: Maintain or return to baseline ADL function  Description  INTERVENTIONS:  -  Assess patient's ability to carry out ADLs; assess patient's baseline for ADL function and identify physical deficits which impact ability to perform ADLs (bathing, care of mouth/teeth, toileting, grooming, dressing, etc )  - Assess/evaluate cause of self-care deficits   - Assess range of motion  - Assess patient's mobility; develop plan if impaired  - Assess patient's need for assistive devices and provide as appropriate  - Encourage maximum independence but intervene and supervise when necessary  ¯ Involve family in performance of ADLs  ¯ Assess for home care needs following discharge   ¯ Request OT consult to assist with ADL evaluation and planning for discharge  ¯ Provide patient education as appropriate  Outcome: Progressing  Goal: Maintain or return mobility status to optimal level  Description  INTERVENTIONS:  - Assess patient's baseline mobility status (ambulation, transfers, stairs, etc )    - Identify cognitive and physical deficits and behaviors that affect mobility  - Identify mobility aids required to assist with transfers and/or ambulation (gait belt, sit-to-stand, lift, walker, cane, etc )  - [place] fall precautions as indicated by assessment  - Record patient progress and toleration of activity level on Mobility SBAR; progress patient to next Phase/Stage  - Instruct patient to call for assistance with activity based on assessment  - Request Rehabilitation consult to assist with strengthening/weightbearing, etc   Outcome: Progressing     Problem: DISCHARGE PLANNING  Goal: Discharge to home or other facility with appropriate resources  Description  INTERVENTIONS:  - Identify barriers to discharge w/patient and caregiver  - Arrange for needed discharge resources and transportation as appropriate  - Identify discharge learning needs (meds, wound care, etc )  - Arrange for interpretive services to assist at discharge as needed  - Refer to Case Management Department for coordinating discharge planning if the patient needs post-hospital services based on physician/advanced practitioner order or complex needs related to functional status, cognitive ability, or social support system  Outcome: Progressing     Problem: Knowledge Deficit  Goal: Patient/family/caregiver demonstrates understanding of disease process, treatment plan, medications, and discharge instructions  Description  Complete learning assessment and assess knowledge base  Interventions:  - Provide teaching at level of understanding  - Provide teaching via preferred learning methods  Outcome: Progressing     Problem: NEUROSENSORY - ADULT  Goal: Achieves stable or improved neurological status  Description  INTERVENTIONS  - Monitor and report changes in neurological status  - Initiate measures to prevent increased intracranial pressure  - Maintain blood pressure and fluid volume within ordered parameters to optimize cerebral perfusion  - Monitor temperature, glucose, and sodium or any other associated labs   Initiate appropriate interventions as ordered  - Monitor for seizure activity   - Administer anti-seizure medications as ordered  Outcome: Progressing  Goal: Absence of seizures  Description  INTERVENTIONS  - Monitor for seizure activity  - Administer anti-seizure medications as ordered  - Monitor neurological status  Outcome: Progressing  Goal: Remains free of injury related to seizures activity  Description  INTERVENTIONS  - Maintain airway, patient safety  and administer oxygen as ordered  - Monitor patient for seizure activity, document and report duration and description of seizure to physician/advanced practitioner  - If seizure occurs,  ensure patient safety during seizure  - Reorient patient post seizure  - Seizure pads on all 4 side rails  - Instruct patient/family to notify RN of any seizure activity including if an aura is experienced  - Instruct patient/family to call for assistance with activity based on nursing assessment  - Administer anti-seizure medications as ordered  - Monitor fetal well being  Outcome: Progressing  Goal: Achieves maximal functionality and self care  Description  INTERVENTIONS  - Monitor swallowing and airway patency with patient fatigue and changes in neurological status  - Encourage and assist patient to increase activity and self care with guidance from rehab services  - Encourage visually impaired, hearing impaired and aphasic patients to use assistive/communication devices  Outcome: Progressing     Problem: Neurological Deficit  Goal: Neurological status is stable or improving  Description  Interventions:  - Monitor and assess patient's level of consciousness, motor function, sensory function, and level of assistance needed for ADLs  - Monitor and report changes from baseline  Collaborate with interdisciplinary team to initiate plan and implement interventions as ordered  - Provide and maintain a safe environment  - Utilize seizure precautions  - Utilize fall precautions  - Utilize aspiration precautions  - Utilize bleeding precautions  Outcome: Progressing     Problem:  Activity Intolerance/Impaired Mobility  Goal: Mobility/activity is maintained at optimum level for patient  Description  Interventions:  - Assess and monitor patient  barriers to mobility and need for assistive/adaptive devices  - Assess patient's emotional response to limitations  - Collaborate with interdisciplinary team and initiate plans and interventions as ordered  - Encourage independent activity per ability   - Maintain proper body alignment  - Perform active/passive rom as tolerated/ordered  - Plan activities to conserve energy   - Turn patient  Outcome: Progressing     Problem: Communication Impairment  Goal: Ability to express needs and understand communication  Description  Assess patient's communication skills and ability to understand information  Patient will demonstrate use of effective communication techniques, alternative methods of communication and understanding even if not able to speak  - Encourage communication and provide alternate methods of communication as needed  - Collaborate with case management/ for discharge needs  - Include patient/family/caregiver in decisions related to communication  Outcome: Progressing     Problem: Potential for Aspiration  Goal: Non-ventilated patient's risk of aspiration is minimized  Description  Assess and monitor vital signs, respiratory status, and labs (WBC)  Monitor for signs of aspiration (tachypnea, cough, rales, wheezing, cyanosis, fever)  - Assess and monitor patient's ability to swallow  - Place patient up in chair to eat if possible  - HOB up at 90 degrees to eat if unable to get patient up into chair   - Supervise patient during oral intake  - Instruct patient to take small bites  - Instruct patient to take small single sips when taking liquids  - Follow patient-specific strategies generated by speech pathologist   Outcome: Progressing  Goal: Ventilated patient's risk of aspiration is minimized  Description  Assess and monitor vital signs, respiratory status, airway cuff pressure, and labs (WBC)    Monitor for signs of aspiration (tachypnea, cough, rales, wheezing, cyanosis, fever)  - Elevate head of bed 30 degrees if patient has tube feeding   - Monitor tube feeding  Outcome: Progressing     Problem: Nutrition  Goal: Nutrition/Hydration status is improving  Description  Monitor and assess patient's nutrition/hydration status for malnutrition (ex- brittle hair, bruises, dry skin, pale skin and conjunctiva, muscle wasting, smooth red tongue, and disorientation)  Collaborate with interdisciplinary team and initiate plan and interventions as ordered  Monitor patient's weight and dietary intake as ordered or per policy  Utilize nutrition screening tool and intervene per policy  Determine patient's food preferences and provide high-protein, high-caloric foods as appropriate  - Assist patient with eating   - Allow adequate time for meals   - Encourage patient to take dietary supplement as ordered  - Collaborate with clinical nutritionist   - Include patient/family/caregiver in decisions related to nutrition  Outcome: Progressing     Problem: Nutrition/Hydration-ADULT  Goal: Nutrient/Hydration intake appropriate for improving, restoring or maintaining nutritional needs  Description  Monitor and assess patient's nutrition/hydration status for malnutrition (ex- brittle hair, bruises, dry skin, pale skin and conjunctiva, muscle wasting, smooth red tongue, and disorientation)  Collaborate with interdisciplinary team and initiate plan and interventions as ordered  Monitor patient's weight and dietary intake as ordered or per policy  Utilize nutrition screening tool and intervene per policy  Determine patient's food preferences and provide high-protein, high-caloric foods as appropriate       INTERVENTIONS:  - Monitor oral intake, urinary output, labs, and treatment plans  - Assess nutrition and hydration status and recommend course of action  - Evaluate amount of meals eaten  - Assist patient with eating if necessary - Allow adequate time for meals  - Recommend/ encourage appropriate diets, oral nutritional supplements, and vitamin/mineral supplements  - Order, calculate, and assess calorie counts as needed  - Recommend, monitor, and adjust tube feedings and TPN/PPN based on assessed needs  - Assess need for intravenous fluids  - Provide specific nutrition/hydration education as appropriate  - Include patient/family/caregiver in decisions related to nutrition  Outcome: Progressing

## 2019-03-12 VITALS
HEART RATE: 81 BPM | OXYGEN SATURATION: 98 % | HEIGHT: 66 IN | WEIGHT: 171.96 LBS | TEMPERATURE: 97.3 F | RESPIRATION RATE: 15 BRPM | SYSTOLIC BLOOD PRESSURE: 125 MMHG | BODY MASS INDEX: 27.64 KG/M2 | DIASTOLIC BLOOD PRESSURE: 80 MMHG

## 2019-03-12 LAB
ANION GAP SERPL CALCULATED.3IONS-SCNC: 10 MMOL/L (ref 4–13)
BASOPHILS # BLD AUTO: 0.08 THOUSANDS/ΜL (ref 0–0.1)
BASOPHILS NFR BLD AUTO: 1 % (ref 0–1)
BUN SERPL-MCNC: 15 MG/DL (ref 5–25)
CALCIUM SERPL-MCNC: 8.9 MG/DL (ref 8.3–10.1)
CARDIOLIPIN IGA SER IA-ACNC: <9 APL U/ML (ref 0–11)
CARDIOLIPIN IGG SER IA-ACNC: <9 GPL U/ML (ref 0–14)
CARDIOLIPIN IGM SER IA-ACNC: <9 MPL U/ML (ref 0–12)
CHLORIDE SERPL-SCNC: 104 MMOL/L (ref 100–108)
CO2 SERPL-SCNC: 26 MMOL/L (ref 21–32)
CREAT SERPL-MCNC: 0.8 MG/DL (ref 0.6–1.3)
DEPRECATED AT III PPP: 100 % OF NORMAL (ref 92–136)
DSDNA AB SER-ACNC: <1 IU/ML (ref 0–9)
ENA SS-A AB SER-ACNC: <0.2 AI (ref 0–0.9)
ENA SS-B AB SER-ACNC: <0.2 AI (ref 0–0.9)
EOSINOPHIL # BLD AUTO: 0.38 THOUSAND/ΜL (ref 0–0.61)
EOSINOPHIL NFR BLD AUTO: 7 % (ref 0–6)
ERYTHROCYTE [DISTWIDTH] IN BLOOD BY AUTOMATED COUNT: 16.9 % (ref 11.6–15.1)
GFR SERPL CREATININE-BSD FRML MDRD: 90 ML/MIN/1.73SQ M
GLUCOSE P FAST SERPL-MCNC: 131 MG/DL (ref 65–99)
GLUCOSE SERPL-MCNC: 131 MG/DL (ref 65–140)
GLUCOSE SERPL-MCNC: 135 MG/DL (ref 65–140)
GLUCOSE SERPL-MCNC: 141 MG/DL (ref 65–140)
HCT VFR BLD AUTO: 34.9 % (ref 34.8–46.1)
HGB BLD-MCNC: 10.8 G/DL (ref 11.5–15.4)
IMM GRANULOCYTES # BLD AUTO: 0.02 THOUSAND/UL (ref 0–0.2)
IMM GRANULOCYTES NFR BLD AUTO: 0 % (ref 0–2)
LYMPHOCYTES # BLD AUTO: 1.65 THOUSANDS/ΜL (ref 0.6–4.47)
LYMPHOCYTES NFR BLD AUTO: 29 % (ref 14–44)
MCH RBC QN AUTO: 25 PG (ref 26.8–34.3)
MCHC RBC AUTO-ENTMCNC: 30.9 G/DL (ref 31.4–37.4)
MCV RBC AUTO: 81 FL (ref 82–98)
MONOCYTES # BLD AUTO: 0.4 THOUSAND/ΜL (ref 0.17–1.22)
MONOCYTES NFR BLD AUTO: 7 % (ref 4–12)
NEUTROPHILS # BLD AUTO: 3.24 THOUSANDS/ΜL (ref 1.85–7.62)
NEUTS SEG NFR BLD AUTO: 56 % (ref 43–75)
NRBC BLD AUTO-RTO: 0 /100 WBCS
PLATELET # BLD AUTO: 371 THOUSANDS/UL (ref 149–390)
PMV BLD AUTO: 9.7 FL (ref 8.9–12.7)
POTASSIUM SERPL-SCNC: 3.7 MMOL/L (ref 3.5–5.3)
RBC # BLD AUTO: 4.32 MILLION/UL (ref 3.81–5.12)
SODIUM SERPL-SCNC: 140 MMOL/L (ref 136–145)
WBC # BLD AUTO: 5.77 THOUSAND/UL (ref 4.31–10.16)

## 2019-03-12 PROCEDURE — 94640 AIRWAY INHALATION TREATMENT: CPT

## 2019-03-12 PROCEDURE — 85025 COMPLETE CBC W/AUTO DIFF WBC: CPT | Performed by: PHYSICIAN ASSISTANT

## 2019-03-12 PROCEDURE — 99213 OFFICE O/P EST LOW 20 MIN: CPT | Performed by: PSYCHIATRY & NEUROLOGY

## 2019-03-12 PROCEDURE — 99239 HOSP IP/OBS DSCHRG MGMT >30: CPT | Performed by: PHYSICIAN ASSISTANT

## 2019-03-12 PROCEDURE — 94760 N-INVAS EAR/PLS OXIMETRY 1: CPT

## 2019-03-12 PROCEDURE — 82948 REAGENT STRIP/BLOOD GLUCOSE: CPT

## 2019-03-12 PROCEDURE — 80048 BASIC METABOLIC PNL TOTAL CA: CPT | Performed by: PHYSICIAN ASSISTANT

## 2019-03-12 RX ORDER — TOPIRAMATE 25 MG/1
25 TABLET ORAL 2 TIMES DAILY
Qty: 60 TABLET | Refills: 0 | Status: SHIPPED | OUTPATIENT
Start: 2019-03-12

## 2019-03-12 RX ADMIN — KETOROLAC TROMETHAMINE 30 MG: 30 INJECTION, SOLUTION INTRAMUSCULAR at 09:16

## 2019-03-12 RX ADMIN — METOCLOPRAMIDE 10 MG: 5 INJECTION, SOLUTION INTRAMUSCULAR; INTRAVENOUS at 05:42

## 2019-03-12 RX ADMIN — ENOXAPARIN SODIUM 40 MG: 40 INJECTION SUBCUTANEOUS at 09:16

## 2019-03-12 RX ADMIN — IPRATROPIUM BROMIDE AND ALBUTEROL SULFATE 3 ML: 2.5; .5 SOLUTION RESPIRATORY (INHALATION) at 07:55

## 2019-03-12 RX ADMIN — ASPIRIN 81 MG 81 MG: 81 TABLET ORAL at 09:16

## 2019-03-12 RX ADMIN — IPRATROPIUM BROMIDE AND ALBUTEROL SULFATE 3 ML: 2.5; .5 SOLUTION RESPIRATORY (INHALATION) at 13:44

## 2019-03-12 RX ADMIN — TOPIRAMATE 25 MG: 25 TABLET, FILM COATED ORAL at 09:16

## 2019-03-12 NOTE — ASSESSMENT & PLAN NOTE
·   · She will follow up with PCP to discuss medical treatment    She wishes to try lifestyle changes first

## 2019-03-12 NOTE — PROGRESS NOTES
Neurology - Progress Note  Rocael Ren 39 y o  female MRN: [id number]  Unit/Bed#: -01 Encounter: [id number]    Assessment:  Right temporalis strain  3 week hx of intermittent aches localized to this region, possible vasovagal component from the discomfort causing secondary symptoms  Currently asymptomatic  ESR wnl, unlikely temporal arteritis  Mild rt sided cervicalgia  No meningeal signs/symptoms  Plan:  Stable for discharge from neuro standpoint  Will d/c the aspirin and lipitor  ldl elevated at 122, pt prefers diet control at this point despite hb a1c of 6 7 suggestive of diabetes  Pt will find a neurologist in the [place] area to follow up with  Slow stretching exercises for neck and posturepedic pillow may be beneficial  Regarding the topamax, continue on 25 mg po bid dose  If very infrequent pain, no need to increase dosing as eventually would taper off  If still discomfort then can increase the night dose to 50 mg in 4-5 days then a week after that increase the morning dose to 50 mg as well  Subjective:   No current head pain  Mentions when looking down at times feels rocking sensation, new for past two weeks  ROS:  Per 12 point review, episodes of rt sided head pain, rocking feeling when looking down    Vitals: Blood pressure 115/73, pulse 88, temperature 97 7 °F (36 5 °C), temperature source Oral, resp  rate 18, height 5' 6" (1 676 m), weight 78 kg (171 lb 15 3 oz), SpO2 97 %  ,Body mass index is 27 75 kg/m²  Physical Exam:    General appearance: alert, appears stated age and cooperative  Head: Normocephalic, without obvious abnormality, atraumatic  Musculoskeletal: slight tenderness upon palpation of rt temporalis and rt levator scapulae      Neurologic: Mental status: Alert, orientedX3, thought content appropriate  Cn 2-12 intact, no nystagmus  brooklynn halpike negative  Motor: normal tone and bulk  5 power ue/le bilat  Gait: Fluid smooth, no LOB w cadance change  Lab, Imaging and other studies: I have personally reviewed pertinent reports          Counseling / Coordination of Care  Total 25 min spent evaluating patient and coming up with assessment/plan

## 2019-03-12 NOTE — ASSESSMENT & PLAN NOTE
· Patient reports intermittent palpitations  · Monitored on telemetry  · Cardiology evaluated  Echo showed a normal EF of 55-65% and no regional wall motion abnormalities  · Recommend outpatient follow up

## 2019-03-12 NOTE — ASSESSMENT & PLAN NOTE
· Patient presented with a right temporal headache x several weeks  She is improved  She was diagnosed with a right temporalis strain  · CTA Head and Neck showed artifactual filling defects  · MRV/MRA of the Head was normal   · ESR normal   · Appreciate neurology input  · Started on Topamax 25mg po BID  Can gradually taper/increase to 50mg po BID if needed as outpatient upon follow up with Neurology  · Slow stretching exercises of the neck recommended and a posturpedic pillow  · She is stable for discharge today with outpatient follow up

## 2019-03-12 NOTE — DISCHARGE SUMMARY
Discharge- Niraj Champ 1975, 39 y o  female MRN: [id number]    Unit/Bed#: -01 Encounter: [id number]    Primary Care Provider: Dale Alvarado MD   Date and time admitted to hospital: 3/9/2019 11:20 PM        Headache  Assessment & Plan  · Patient presented with a right temporal headache x several weeks  She is improved  She was diagnosed with a right temporalis strain  · CTA Head and Neck showed artifactual filling defects  · MRV/MRA of the Head was normal   · ESR normal   · Appreciate neurology input  · Started on Topamax 25mg po BID  Can gradually taper/increase to 50mg po BID if needed as outpatient upon follow up with Neurology  · Slow stretching exercises of the neck recommended and a posturpedic pillow  · She is stable for discharge today with outpatient follow up  Palpitations  Assessment & Plan  · Patient reports intermittent palpitations  · Monitored on telemetry  · Cardiology evaluated  Echo showed a normal EF of 55-65% and no regional wall motion abnormalities  · Recommend outpatient follow up  Asthma  Assessment & Plan  · Continue Albuteral prn  Hyperlipidemia  Assessment & Plan  ·   · She will follow up with PCP to discuss medical treatment  She wishes to try lifestyle changes first     Diabetes [facility])  Assessment & Plan  Lab Results   Component Value Date    HGBA1C 6 6 (H) 03/11/2019       Recent Labs     03/11/19  1617 03/11/19  2114 03/12/19  0634 03/12/19  1118   POCGLU 96 115 141* 135       Blood Sugar Average: Last 72 hrs:  (P) 130 2   HGB A1C 6 7 consistent with a diagnosis of diabetes  Discussed this diagnosis with patient  Nutrition consulted and discussed carb consistent diet recommendations with patient  She will follow up with her PCP this week as outpatient for further management/medication treatment      Discharging Physician / Practitioner: Carley Olivera PA-C  PCP: Dale lAvarado MD  Admission Date:   Admission Orders (From admission, onward)    Ordered        03/10/19 0646  Place in Observation  Once     Order ID Start Status   [id number] 03/10/19 0647 Completed              Discharge Date: 03/12/19    Resolved Problems  Date Reviewed: 3/12/2019    None          Consultations During Hospital Stay:  · Cardiology  · Neurology  · Nutrition    Procedures Performed:   · None    Significant Findings / Test Results:   · CTA Head and Neck showed artifactual filling defects  · MRV/MRA of the Head was normal   · ESR normal   · Echo showed an ejection fraction of 55-65% and no regional wall motion abnormalities  · Hemoglobin A1c of 6 7  · LDL of 124  Test Results Pending at Discharge (will require follow up): · Sjogren's antibodies, anti DNA antibody double-stranded, thrombosis panel, anti-ribonucleic acid antibody  Outpatient Tests Requested:  · Follow-up with Neurology and primary care physician    Complications:  None    Reason for Admission:  Headache    Hospital Course:     Alberta Harper is a 39 y o  female patient who originally presented to the hospital on 3/9/2019 due to significant ongoing right-sided temporal headache for a few weeks  She was seen by Neurology in consultation  She underwent imaging MRV/MRA which was normal   She was started on migraine protocol and was put on treatment with Topamax 25 mg twice a day  Her headache improved  She is to follow up with Neurology as an outpatient  She also reported palpitations and was monitored on telemetry and Cardiology was consulted  She had an echo which showed a normal ejection fraction of 55-65% and no regional motion abnormalities  She also was diagnosed with diabetes with a hemoglobin A1c of 6 7  She received education from Nutrition Services regarding a carb consistent diet  She reports she does check her glucose levels at home    She was instructed of the importance of following up with her primary care physician this week for further recommendations/medical therapy  Please see above list of diagnoses and related plan for additional information  Condition at Discharge: stable     Discharge Day Visit / Exam:     Subjective:  Patient feels better and is eager for discharge home  She states her headache is better  No vision changes  No chest pain or shortness of breath  She understands the importance of close follow-up and further management  Vitals: Blood Pressure: 125/80 (03/12/19 1300)  Pulse: 81 (03/12/19 1300)  Temperature: (!) 97 3 °F (36 3 °C) (03/12/19 1300)  Temp Source: Oral (03/12/19 0430)  Respirations: 15 (03/12/19 1300)  Height: 5' 6" (167 6 cm) (03/11/19 1207)  Weight - Scale: 78 kg (171 lb 15 3 oz) (03/11/19 1207)  SpO2: 98 % (03/12/19 1344)  Exam:   Physical Exam   Constitutional: She is oriented to person, place, and time  No distress  HENT:   Head: Normocephalic and atraumatic  Eyes: No scleral icterus  Neck: Neck supple  Cardiovascular: Normal rate and regular rhythm  Pulmonary/Chest: Effort normal  No respiratory distress  She has no wheezes  She has no rales  Abdominal: Soft  Bowel sounds are normal  She exhibits no distension  There is no tenderness  Musculoskeletal: She exhibits no edema  Neurological: She is alert and oriented to person, place, and time  Skin: Skin is warm and dry  She is not diaphoretic  Vitals reviewed  Discussion with Family:  Discussed with family at bedside    Discharge instructions/Information to patient and family:   See after visit summary for information provided to patient and family  Provisions for Follow-Up Care:  See after visit summary for information related to follow-up care and any pertinent home health orders        Disposition:     Home    For Discharges to [facility]F:   · Not Applicable to this Patient - Not Applicable to this Patient    Planned Readmission: None     Discharge Statement:  I spent 35 minutes discharging the patient  This time was spent on the day of discharge  I had direct contact with the patient on the day of discharge  Greater than 50% of the total time was spent examining patient, answering all patient questions, arranging and discussing plan of care with patient as well as directly providing post-discharge instructions  Additional time then spent on discharge activities  Discharge Medications:  See after visit summary for reconciled discharge medications provided to patient and family        ** Please Note: This note has been constructed using a voice recognition system **

## 2019-03-12 NOTE — DISCHARGE INSTRUCTIONS
You were diagnosed with a right temporalis strain as the cause of your ongoing headache  You were started on the medication Topamax 25mg by mouth twice a day  Make sure to follow up as an outpatient with neurology  You were also diagnosed with diabetes with a HGB A1C of 6 7 and elevated cholesterol of 122  Lifestyle modifications recommended  Follow up with your PCP regarding further management and medical therapy  Topiramate (By mouth)   Topiramate ([id number]-PIR-a-mate)  Treats and prevents seizures, and helps prevent migraine headaches  Brand Name(s): Qudexy XR, Topamax, Trokendi XR   There may be other brand names for this medicine  When This Medicine Should Not Be Used: This medicine is not right for everyone  Do not use it if you had an allergic reaction to topiramate, or if you are pregnant  How to Use This Medicine:   Capsule, Long Acting Capsule, Tablet  · Take your medicine as directed  Your dose may need to be changed several times to find what works best for you  · Tablet: Swallow whole  Do not break, crush, or chew the tablet  It has a very bitter taste  · Capsule or extended-release capsule: Do not crush or chew the capsule  Swallow whole or open the capsule and pour the medicine into a small amount (1 teaspoon) of soft food, such as applesauce  Swallow the mixture right away without chewing  Do not store the mixture for use at a later time  · Drink extra fluids so you will urinate more often and help prevent kidney problems  · This medicine should come with a Medication Guide  Ask your pharmacist for a copy if you do not have one  · Missed dose: Take a dose as soon as you remember  If it is almost time for your next dose, wait until then and take a regular dose  Do not take extra medicine to make up for a missed dose  If you miss a dose or forget to use your medicine, use it as soon as you can   If your next regular dose of Topamax® is less than 6 hours away, wait until then to use the medicine and skip the missed dose  If you miss more than 1 dose of Topamax®, call your doctor for instructions  · Store the medicine in a closed container at room temperature, away from heat, moisture, and direct light  Drugs and Foods to Avoid:   Ask your doctor or pharmacist before using any other medicine, including over-the-counter medicines, vitamins, and herbal products  · Do not drink alcohol with Qudexy XR or Topamax®  Do not drink alcohol for 6 hours before and 6 hours after you take the Trokendi XR capsule  · Some medicines can affect how topiramate works  Tell your doctor if you are using acetazolamide, dichlorphenamide, dichloralphenazone, digoxin, lithium, metformin, zonisamide, other medicine for seizures (such as carbamazepine, phenytoin, valproic acid), or birth control pills  · Tell your doctor if you are using any medicine that makes you sleepy, such as allergy medicine or narcotic pain medicine  Warnings While Using This Medicine:   · It is not safe to take this medicine during pregnancy  It could harm an unborn baby  Tell your doctor right away if you become pregnant  · Tell your doctor if you are breastfeeding, or if you have kidney disease, liver disease, glaucoma, lung or breathing problems, osteoporosis, or a history of depression or mood disorders  Tell your doctor if you are on a ketogenic diet (high in fat and low in carbohydrates)  · This medicine may cause the following problems:  ¨ Eye pain or vision changes, including glaucoma  ¨ Changes in body temperature  ¨ Metabolic acidosis (too much acid in the blood)  ¨ Kidney stones  · This medicine may increase depression or thoughts of suicide  Tell your doctor right away if you start to feel more depressed or think about hurting yourself  · This medicine may make you dizzy, drowsy, or tired  Do not drive or do anything else that could be dangerous until you know how this medicine affects you    · Do not stop using this medicine suddenly  Your doctor will need to slowly decrease your dose before you stop it completely  · Your doctor will do lab tests at regular visits to check on the effects of this medicine  Keep all appointments  · Keep all medicine out of the reach of children  Never share your medicine with anyone  Possible Side Effects While Using This Medicine:   Call your doctor right away if you notice any of these side effects:  · Allergic reaction: Itching or hives, swelling in your face or hands, swelling or tingling in your mouth or throat, chest tightness, trouble breathing  · Bloody or cloudy urine, painful urination, sudden lower back or stomach pain  · Changes in vision, eye pain  · Confusion, problems with walking, clumsiness, dizziness, or trouble talking, concentrating, or remembering  · Feeling agitated, depressed, nervous, or irritable, thoughts of hurting yourself or others, unusual mood or behavior  · Fever, decreased sweating  · Numbness, tingling, or burning pain in your hands, arms, legs, or feet  · Rapid, deep breathing, loss of appetite, fast or uneven heartbeat  · Vomiting, unusual drowsiness, tiredness, or weakness  If you notice these less serious side effects, talk with your doctor:   · Change in taste  · Nausea, diarrhea  · Stuffy or runny nose  · Weight loss  If you notice other side effects that you think are caused by this medicine, tell your doctor  Call your doctor for medical advice about side effects  You may report side effects to FDA at 7-082-FDA-6500  © 2017 [facility] Information is for End User's use only and may not be sold, redistributed or otherwise used for commercial purposes  The above information is an  only  It is not intended as medical advice for individual conditions or treatments  Talk to your doctor, nurse or pharmacist before following any medical regimen to see if it is safe and effective for you

## 2019-03-12 NOTE — ASSESSMENT & PLAN NOTE
Lab Results   Component Value Date    HGBA1C 6 6 (H) 03/11/2019       Recent Labs     03/11/19  1617 03/11/19  2114 03/12/19  0634 03/12/19  1118   POCGLU 96 115 141* 135       Blood Sugar Average: Last 72 hrs:  (P) 130 2   HGB A1C 6 7 consistent with a diagnosis of diabetes  Discussed this diagnosis with patient  Nutrition consulted and discussed carb consistent diet recommendations with patient  She will follow up with her PCP this week as outpatient for further management/medication treatment

## 2019-03-13 LAB
1,25(OH)2D3 SERPL-MCNC: 68.5 PG/ML (ref 19.9–79.3)
APTT SCREEN TO CONFIRM RATIO: 0.91 RATIO (ref 0–1.4)
B2 GLYCOPROT1 IGA SER-ACNC: <9 GPI IGA UNITS (ref 0–25)
B2 GLYCOPROT1 IGG SER-ACNC: <9 GPI IGG UNITS (ref 0–20)
B2 GLYCOPROT1 IGM SER-ACNC: <9 GPI IGM UNITS (ref 0–32)
CH50 SERPL-ACNC: >60 U/ML
CONFIRM APTT/NORMAL: 39.2 SEC (ref 0–55)
LA PPP-IMP: NORMAL
PROT S ACT/NOR PPP: 104 % (ref 57–157)
PROT S PPP-ACNC: 107 % (ref 60–150)
SCREEN APTT: 38.4 SEC (ref 0–51.9)
SCREEN DRVVT: 34.1 SEC (ref 0–47)
THROMBIN TIME: 18.5 SEC (ref 0–23)

## 2019-03-14 LAB
PROT C AG ACT/NOR PPP IA: >150 % OF NORMAL (ref 60–150)
PROT S ACT/NOR PPP: 104 % (ref 63–140)

## 2019-03-15 LAB
F2 GENE MUT ANL BLD/T: NORMAL
F5 GENE MUT ANL BLD/T: NORMAL

## 2019-03-21 LAB — RNA AB SER-ACNC: 9 EU/ML

## 2019-05-10 ENCOUNTER — HOSPITAL ENCOUNTER (EMERGENCY)
Facility: HOSPITAL | Age: 44
Discharge: HOME | End: 2019-05-11
Attending: EMERGENCY MEDICINE
Payer: MEDICAID

## 2019-05-10 VITALS
OXYGEN SATURATION: 100 % | SYSTOLIC BLOOD PRESSURE: 125 MMHG | DIASTOLIC BLOOD PRESSURE: 81 MMHG | HEART RATE: 85 BPM | WEIGHT: 172.18 LBS | RESPIRATION RATE: 20 BRPM | BODY MASS INDEX: 27.79 KG/M2 | TEMPERATURE: 98.7 F

## 2019-05-10 DIAGNOSIS — R42 DIZZINESS: ICD-10-CM

## 2019-05-10 DIAGNOSIS — R00.2 PALPITATIONS: ICD-10-CM

## 2019-05-10 DIAGNOSIS — R51.9 TEMPORAL PAIN: Primary | ICD-10-CM

## 2019-05-10 LAB
ANION GAP SERPL CALCULATED.3IONS-SCNC: 9 MMOL/L (ref 4–13)
ATRIAL RATE: 90 BPM
BASOPHILS # BLD AUTO: 0.06 THOUSANDS/ΜL (ref 0–0.1)
BASOPHILS NFR BLD AUTO: 1 % (ref 0–1)
BILIRUB UR QL STRIP: NEGATIVE
BUN SERPL-MCNC: 10 MG/DL (ref 5–25)
CALCIUM SERPL-MCNC: 9 MG/DL (ref 8.3–10.1)
CHLORIDE SERPL-SCNC: 101 MMOL/L (ref 100–108)
CLARITY UR: CLEAR
CO2 SERPL-SCNC: 28 MMOL/L (ref 21–32)
COLOR UR: YELLOW
CREAT SERPL-MCNC: 0.75 MG/DL (ref 0.6–1.3)
EOSINOPHIL # BLD AUTO: 0.15 THOUSAND/ΜL (ref 0–0.61)
EOSINOPHIL NFR BLD AUTO: 2 % (ref 0–6)
ERYTHROCYTE [DISTWIDTH] IN BLOOD BY AUTOMATED COUNT: 17.7 % (ref 11.6–15.1)
ERYTHROCYTE [SEDIMENTATION RATE] IN BLOOD: 8 MM/HOUR (ref 0–20)
GFR SERPL CREATININE-BSD FRML MDRD: 97 ML/MIN/1.73SQ M
GLUCOSE SERPL-MCNC: 132 MG/DL (ref 65–140)
GLUCOSE UR STRIP-MCNC: NEGATIVE MG/DL
HCT VFR BLD AUTO: 40.3 % (ref 34.8–46.1)
HGB BLD-MCNC: 12.7 G/DL (ref 11.5–15.4)
HGB UR QL STRIP.AUTO: NEGATIVE
IMM GRANULOCYTES # BLD AUTO: 0.01 THOUSAND/UL (ref 0–0.2)
IMM GRANULOCYTES NFR BLD AUTO: 0 % (ref 0–2)
KETONES UR STRIP-MCNC: ABNORMAL MG/DL
LEUKOCYTE ESTERASE UR QL STRIP: NEGATIVE
LYMPHOCYTES # BLD AUTO: 1.28 THOUSANDS/ΜL (ref 0.6–4.47)
LYMPHOCYTES NFR BLD AUTO: 20 % (ref 14–44)
MCH RBC QN AUTO: 26.4 PG (ref 26.8–34.3)
MCHC RBC AUTO-ENTMCNC: 31.5 G/DL (ref 31.4–37.4)
MCV RBC AUTO: 84 FL (ref 82–98)
MONOCYTES # BLD AUTO: 0.37 THOUSAND/ΜL (ref 0.17–1.22)
MONOCYTES NFR BLD AUTO: 6 % (ref 4–12)
NEUTROPHILS # BLD AUTO: 4.5 THOUSANDS/ΜL (ref 1.85–7.62)
NEUTS SEG NFR BLD AUTO: 71 % (ref 43–75)
NITRITE UR QL STRIP: NEGATIVE
NRBC BLD AUTO-RTO: 0 /100 WBCS
P AXIS: 73 DEGREES
PH UR STRIP.AUTO: 7 [PH]
PLATELET # BLD AUTO: 376 THOUSANDS/UL (ref 149–390)
PMV BLD AUTO: 9.8 FL (ref 8.9–12.7)
POTASSIUM SERPL-SCNC: 3.7 MMOL/L (ref 3.5–5.3)
PR INTERVAL: 146 MS
PROT UR STRIP-MCNC: NEGATIVE MG/DL
QRS AXIS: 72 DEGREES
QRSD INTERVAL: 88 MS
QT INTERVAL: 376 MS
QTC INTERVAL: 459 MS
RBC # BLD AUTO: 4.81 MILLION/UL (ref 3.81–5.12)
SODIUM SERPL-SCNC: 138 MMOL/L (ref 136–145)
SP GR UR STRIP.AUTO: 1.01 (ref 1–1.03)
T WAVE AXIS: 88 DEGREES
TROPONIN I SERPL-MCNC: 0.02 NG/ML
UROBILINOGEN UR QL STRIP.AUTO: 0.2 E.U./DL
VENTRICULAR RATE: 90 BPM
WBC # BLD AUTO: 6.37 THOUSAND/UL (ref 4.31–10.16)

## 2019-05-10 PROCEDURE — 99284 EMERGENCY DEPT VISIT MOD MDM: CPT

## 2019-05-10 PROCEDURE — 93010 ELECTROCARDIOGRAM REPORT: CPT | Performed by: INTERNAL MEDICINE

## 2019-05-10 PROCEDURE — 85025 COMPLETE CBC W/AUTO DIFF WBC: CPT | Performed by: EMERGENCY MEDICINE

## 2019-05-10 PROCEDURE — 96375 TX/PRO/DX INJ NEW DRUG ADDON: CPT

## 2019-05-10 PROCEDURE — 85652 RBC SED RATE AUTOMATED: CPT | Performed by: EMERGENCY MEDICINE

## 2019-05-10 PROCEDURE — 36415 COLL VENOUS BLD VENIPUNCTURE: CPT | Performed by: EMERGENCY MEDICINE

## 2019-05-10 PROCEDURE — 99284 EMERGENCY DEPT VISIT MOD MDM: CPT | Performed by: EMERGENCY MEDICINE

## 2019-05-10 PROCEDURE — 93005 ELECTROCARDIOGRAM TRACING: CPT

## 2019-05-10 PROCEDURE — 81003 URINALYSIS AUTO W/O SCOPE: CPT | Performed by: EMERGENCY MEDICINE

## 2019-05-10 PROCEDURE — 84484 ASSAY OF TROPONIN QUANT: CPT | Performed by: EMERGENCY MEDICINE

## 2019-05-10 PROCEDURE — 96374 THER/PROPH/DIAG INJ IV PUSH: CPT

## 2019-05-10 PROCEDURE — 96361 HYDRATE IV INFUSION ADD-ON: CPT

## 2019-05-10 PROCEDURE — 80048 BASIC METABOLIC PNL TOTAL CA: CPT | Performed by: EMERGENCY MEDICINE

## 2019-05-10 RX ORDER — MECLIZINE HYDROCHLORIDE 25 MG/1
25 TABLET ORAL ONCE
Status: COMPLETED | OUTPATIENT
Start: 2019-05-10 | End: 2019-05-10

## 2019-05-10 RX ORDER — METOCLOPRAMIDE HYDROCHLORIDE 5 MG/ML
10 INJECTION INTRAMUSCULAR; INTRAVENOUS ONCE
Status: COMPLETED | OUTPATIENT
Start: 2019-05-10 | End: 2019-05-10

## 2019-05-10 RX ORDER — MECLIZINE HYDROCHLORIDE 25 MG/1
25 TABLET ORAL 3 TIMES DAILY PRN
Qty: 30 TABLET | Refills: 0 | Status: SHIPPED | OUTPATIENT
Start: 2019-05-10

## 2019-05-10 RX ORDER — KETOROLAC TROMETHAMINE 30 MG/ML
15 INJECTION, SOLUTION INTRAMUSCULAR; INTRAVENOUS ONCE
Status: COMPLETED | OUTPATIENT
Start: 2019-05-10 | End: 2019-05-10

## 2019-05-10 RX ORDER — DIPHENHYDRAMINE HYDROCHLORIDE 50 MG/ML
25 INJECTION INTRAMUSCULAR; INTRAVENOUS ONCE
Status: COMPLETED | OUTPATIENT
Start: 2019-05-10 | End: 2019-05-10

## 2019-05-10 RX ADMIN — KETOROLAC TROMETHAMINE 15 MG: 30 INJECTION, SOLUTION INTRAMUSCULAR at 22:23

## 2019-05-10 RX ADMIN — DIPHENHYDRAMINE HYDROCHLORIDE 25 MG: 50 INJECTION, SOLUTION INTRAMUSCULAR; INTRAVENOUS at 22:24

## 2019-05-10 RX ADMIN — SODIUM CHLORIDE 1000 ML: 0.9 INJECTION, SOLUTION INTRAVENOUS at 22:24

## 2019-05-10 RX ADMIN — METOCLOPRAMIDE 10 MG: 5 INJECTION, SOLUTION INTRAMUSCULAR; INTRAVENOUS at 22:24

## 2019-05-10 RX ADMIN — MECLIZINE HYDROCHLORIDE 25 MG: 25 TABLET ORAL at 23:43

## 2021-05-21 NOTE — ED NOTES
Ambulated to restroom with no difficulty,  no dizziness repoted     Brie Alonzo, MOISES  03/10/19 9505 Returned patients call and let her know of appointment on 6/1/21 at 3:45pm. Pt confirmed understanding verbally and had no further questions or concerns.    Lore Reese LPN

## 2024-06-17 ENCOUNTER — OFFICE VISIT (OUTPATIENT)
Age: 49
End: 2024-06-17
Payer: COMMERCIAL

## 2024-06-17 VITALS
HEART RATE: 92 BPM | DIASTOLIC BLOOD PRESSURE: 84 MMHG | BODY MASS INDEX: 27.12 KG/M2 | OXYGEN SATURATION: 96 % | RESPIRATION RATE: 18 BRPM | SYSTOLIC BLOOD PRESSURE: 136 MMHG | WEIGHT: 168 LBS | TEMPERATURE: 97.9 F

## 2024-06-17 DIAGNOSIS — R06.2 WHEEZING: ICD-10-CM

## 2024-06-17 DIAGNOSIS — R05.1 ACUTE COUGH: Primary | ICD-10-CM

## 2024-06-17 PROCEDURE — S9088 SERVICES PROVIDED IN URGENT: HCPCS | Performed by: NURSE PRACTITIONER

## 2024-06-17 PROCEDURE — 99213 OFFICE O/P EST LOW 20 MIN: CPT | Performed by: NURSE PRACTITIONER

## 2024-06-17 RX ORDER — AZITHROMYCIN 250 MG/1
TABLET, FILM COATED ORAL
Qty: 6 TABLET | Refills: 0 | Status: SHIPPED | OUTPATIENT
Start: 2024-06-17 | End: 2024-06-21

## 2024-06-17 RX ORDER — LISINOPRIL 2.5 MG/1
TABLET ORAL
COMMUNITY
Start: 2024-04-11

## 2024-06-17 RX ORDER — TRIAMCINOLONE ACETONIDE 1 MG/G
1 CREAM TOPICAL 2 TIMES DAILY
COMMUNITY
Start: 2023-09-29 | End: 2024-09-28

## 2024-06-17 RX ORDER — LISINOPRIL 5 MG/1
5 TABLET ORAL DAILY
COMMUNITY
Start: 2024-05-15

## 2024-06-17 RX ORDER — GLIPIZIDE 10 MG/1
10 TABLET, FILM COATED, EXTENDED RELEASE ORAL DAILY
COMMUNITY
Start: 2024-05-01 | End: 2025-05-01

## 2024-06-17 RX ORDER — METOPROLOL SUCCINATE 25 MG/1
25 TABLET, EXTENDED RELEASE ORAL DAILY
COMMUNITY
Start: 2024-05-01 | End: 2025-05-01

## 2024-06-17 RX ORDER — DULAGLUTIDE 0.75 MG/.5ML
0.75 INJECTION, SOLUTION SUBCUTANEOUS WEEKLY
COMMUNITY
Start: 2024-05-01

## 2024-06-17 RX ORDER — BLOOD SUGAR DIAGNOSTIC
STRIP MISCELLANEOUS
COMMUNITY
Start: 2024-04-11

## 2024-06-17 RX ORDER — IPRATROPIUM BROMIDE AND ALBUTEROL SULFATE 2.5; .5 MG/3ML; MG/3ML
3 SOLUTION RESPIRATORY (INHALATION) ONCE
Status: COMPLETED | OUTPATIENT
Start: 2024-06-17 | End: 2024-06-17

## 2024-06-17 RX ORDER — GLIPIZIDE 5 MG/1
5 TABLET, FILM COATED, EXTENDED RELEASE ORAL DAILY
COMMUNITY
Start: 2024-04-03

## 2024-06-17 RX ORDER — HYDROXYZINE HYDROCHLORIDE 25 MG/1
25 TABLET, FILM COATED ORAL EVERY 6 HOURS PRN
COMMUNITY
Start: 2024-04-03

## 2024-06-17 RX ORDER — ASPIRIN 81 MG/1
81 TABLET ORAL DAILY
COMMUNITY

## 2024-06-17 RX ORDER — PRAVASTATIN SODIUM 40 MG
40 TABLET ORAL DAILY
COMMUNITY
Start: 2024-04-03 | End: 2025-04-03

## 2024-06-17 RX ORDER — ALBUTEROL SULFATE 2.5 MG/3ML
2.5 SOLUTION RESPIRATORY (INHALATION) EVERY 6 HOURS PRN
Qty: 75 ML | Refills: 0 | Status: SHIPPED | OUTPATIENT
Start: 2024-06-17

## 2024-06-17 RX ADMIN — IPRATROPIUM BROMIDE AND ALBUTEROL SULFATE 3 ML: 2.5; .5 SOLUTION RESPIRATORY (INHALATION) at 14:58

## 2024-06-17 NOTE — PROGRESS NOTES
[facility]        NAME: Delvin Feng is a 48 y.o. female  : 1975    MRN: [id number]  DATE: 2024  TIME: 3:15 PM    Assessment and Plan   Acute cough [R05.1]  1. Acute cough  azithromycin (ZITHROMAX) 250 mg tablet    albuterol (2.5 mg/3 mL) 0.083 % nebulizer solution      2. Wheezing  ipratropium-albuterol (DUO-NEB) 0.5-2.5 mg/3 mL inhalation solution 3 mL        Mini neb    Performed by: MICHELLE Fletcher  Authorized by: MICHELLE Fletcher  Universal Protocol:  Consent: Verbal consent obtained.  Risks and benefits: risks, benefits and alternatives were discussed  Consent given by: patient  Patient understanding: patient states understanding of the procedure being performed  Patient identity confirmed: verbally with patient    Number of treatments:  1  Treatment 1:   Pre-Procedure     Symptoms:  Wheezing and cough    Lung Sounds:  Expiratory wheezing    Medication Administered:  Duoneb - Albuterol 2.5 mg/Atrovent 0.5 mg  Post-Procedure     Symptoms:  Cough    Lung sounds:  Clear        Patient Instructions       Patient Instructions   Take medication as directed.  Rest and drink plenty of fluids. A cool mist humidifier can be helpful.  If you develop a worsening cough, chest pain, shortness of breath, palpitations, coughing up blood, prolonged high fever, any new or concerning symptoms please return or proceed ER.  Recommend following up with PCP in 3-5 days    If tests have been performed at [facility], our office will contact you with results if changes need to be made to the care plan discussed with you at the visit.  [facility]    Chief Complaint     Chief Complaint   Patient presents with    Shortness of Breath     Pt states for 5 days she has had a cough that is affecting her breathing. Pt is asthmatic. Pt was feeling ill before this.         History of Present Illness       Shortness of Breath  The current episode started in the  past 7 days. The problem occurs intermittently. The problem is unchanged. The problem is mild. Associated symptoms include coughing and wheezing. Pertinent negatives include no chest pain, chest pressure, dizziness, fatigue, hoarseness of voice, leg swelling, orthopnea, palpitations, rhinorrhea, sore throat, stridor or sweats. Nothing aggravates the symptoms. She has had no prior steroid use. Past treatments include beta-agonist inhalers. The treatment provided mild relief. Her past medical history is significant for asthma. Urine output has been normal.       Review of Systems   Review of Systems   Constitutional:  Negative for chills, diaphoresis, fatigue and fever.   HENT:  Negative for congestion, ear pain, facial swelling, hoarse voice, postnasal drip, rhinorrhea, sinus pressure, sinus pain, sore throat, tinnitus and trouble swallowing.    Eyes: Negative.    Respiratory:  Positive for cough, shortness of breath and wheezing. Negative for chest tightness and stridor.    Cardiovascular:  Negative for chest pain, palpitations, orthopnea and leg swelling.   Gastrointestinal: Negative.    Musculoskeletal:  Negative for arthralgias, back pain, joint swelling, myalgias, neck pain and neck stiffness.   Neurological:  Negative for dizziness, facial asymmetry, weakness, light-headedness, numbness and headaches.         Current Medications       Current Outpatient Medications:     albuterol (2.5 mg/3 mL) 0.083 % nebulizer solution, Take 3 mL (2.5 mg total) by nebulization every 6 (six) hours as needed for wheezing or shortness of breath, Disp: 75 mL, Rfl: 0    ALBUTEROL IN, Inhale 2 puffs 4 (four) times a day as needed , Disp: , Rfl:     aspirin (ECOTRIN LOW STRENGTH) 81 mg EC tablet, Take 81 mg by mouth daily, Disp: , Rfl:     azithromycin (ZITHROMAX) 250 mg tablet, Take 2 tablets today then 1 tablet daily x 4 days, Disp: 6 tablet, Rfl: 0    Empagliflozin 25 MG TABS, Take 25 mg by mouth daily, Disp: , Rfl:     glipiZIDE  (GLUCOTROL XL) 10 mg 24 hr tablet, Take 10 mg by mouth daily, Disp: , Rfl:     glipiZIDE (GLUCOTROL XL) 5 mg 24 hr tablet, Take 5 mg by mouth daily, Disp: , Rfl:     lisinopril (ZESTRIL) 2.5 mg tablet, TAKE 1 TABLET BY MOUTH DAILY AS NEEDED (FOR SYSTOLI BLOOD PRESSURE >130)., Disp: , Rfl:     lisinopril (ZESTRIL) 5 mg tablet, Take 5 mg by mouth daily, Disp: , Rfl:     meclizine (ANTIVERT) 25 mg tablet, Take 1 tablet (25 mg total) by mouth 3 (three) times a day as needed for dizziness, Disp: 30 tablet, Rfl: 0    metoprolol succinate (TOPROL-XL) 25 mg 24 hr tablet, Take 25 mg by mouth daily, Disp: , Rfl:     OneTouch Ultra test strip, USE 1X PER DAY AS DIRECTED, Disp: , Rfl:     pravastatin (PRAVACHOL) 40 mg tablet, Take 40 mg by mouth daily, Disp: , Rfl:     topiramate (TOPAMAX) 25 mg tablet, Take 1 tablet (25 mg total) by mouth 2 (two) times a day, Disp: 60 tablet, Rfl: 0    triamcinolone (KENALOG) 0.1 % cream, Apply 1 application. topically 2 (two) times a day, Disp: , Rfl:     dulaglutide (Trulicity) 0.75 MG/0.5ML injection, Inject 0.75 mg under the skin Once a week (Patient not taking: Reported on 2024), Disp: , Rfl:     hydrOXYzine HCL (ATARAX) 25 mg tablet, Take 25 mg by mouth every 6 (six) hours as needed (Patient not taking: Reported on 2024), Disp: , Rfl:   No current facility-administered medications for this visit.    Current Allergies     Allergies as of 2024 - Reviewed 2024   Allergen Reaction Noted    Influenza vaccines Hives 2020    Morphine Hives 2019            The following portions of the patient's history were reviewed and updated as appropriate: allergies, current medications, past family history, past medical history, past social history, past surgical history and problem list.     Past Medical History:   Diagnosis Date    Asthma     Crohn disease (HCC)        Past Surgical History:   Procedure Laterality Date     SECTION      HERNIA REPAIR          History reviewed. No pertinent family history.      Medications have been verified.        Objective   /84   Pulse 92   Temp 97.9 °F (36.6 °C)   Resp 18   Wt 76.2 kg (168 lb)   SpO2 96%   BMI 27.12 kg/m²   No LMP recorded.       Physical Exam     Physical Exam  Constitutional:       General: She is not in acute distress.     Appearance: She is well-developed. She is not diaphoretic.   HENT:      Head: Normocephalic and atraumatic.      Right Ear: Hearing, tympanic membrane, ear canal and external ear normal.      Left Ear: Hearing, tympanic membrane, ear canal and external ear normal.      Nose: Nose normal.      Right Sinus: No maxillary sinus tenderness or frontal sinus tenderness.      Left Sinus: No maxillary sinus tenderness or frontal sinus tenderness.      Mouth/Throat:      Mouth: Oropharynx is clear and moist and mucous membranes are normal.      Pharynx: Oropharynx is clear. Uvula midline.   Cardiovascular:      Rate and Rhythm: Normal rate and regular rhythm.      Heart sounds: Normal heart sounds, S1 normal and S2 normal.   Pulmonary:      Effort: Pulmonary effort is normal. No tachypnea, accessory muscle usage, prolonged expiration, respiratory distress or retractions.      Breath sounds: Normal air entry. Examination of the right-middle field reveals wheezing. Examination of the left-middle field reveals wheezing. Examination of the right-lower field reveals wheezing. Examination of the left-lower field reveals wheezing. Wheezing present.   Lymphadenopathy:      Cervical: No cervical adenopathy.   Skin:     General: Skin is warm and dry.      Capillary Refill: Capillary refill takes less than 2 seconds.   Neurological:      Mental Status: She is alert and oriented to person, place, and time.